# Patient Record
Sex: MALE | Race: ASIAN | Employment: FULL TIME | URBAN - METROPOLITAN AREA
[De-identification: names, ages, dates, MRNs, and addresses within clinical notes are randomized per-mention and may not be internally consistent; named-entity substitution may affect disease eponyms.]

---

## 2017-08-18 PROCEDURE — 84402 ASSAY OF FREE TESTOSTERONE: CPT | Performed by: FAMILY MEDICINE

## 2017-08-18 PROCEDURE — 84403 ASSAY OF TOTAL TESTOSTERONE: CPT | Performed by: FAMILY MEDICINE

## 2017-08-18 PROCEDURE — 82607 VITAMIN B-12: CPT | Performed by: FAMILY MEDICINE

## 2017-08-18 PROCEDURE — 83001 ASSAY OF GONADOTROPIN (FSH): CPT | Performed by: FAMILY MEDICINE

## 2017-08-18 PROCEDURE — 82746 ASSAY OF FOLIC ACID SERUM: CPT | Performed by: FAMILY MEDICINE

## 2017-08-18 PROCEDURE — 83002 ASSAY OF GONADOTROPIN (LH): CPT | Performed by: FAMILY MEDICINE

## 2017-12-27 PROCEDURE — 83002 ASSAY OF GONADOTROPIN (LH): CPT | Performed by: INTERNAL MEDICINE

## 2017-12-27 PROCEDURE — 84403 ASSAY OF TOTAL TESTOSTERONE: CPT | Performed by: INTERNAL MEDICINE

## 2017-12-27 PROCEDURE — 84146 ASSAY OF PROLACTIN: CPT | Performed by: INTERNAL MEDICINE

## 2017-12-27 PROCEDURE — 84402 ASSAY OF FREE TESTOSTERONE: CPT | Performed by: INTERNAL MEDICINE

## 2017-12-27 PROCEDURE — 83001 ASSAY OF GONADOTROPIN (FSH): CPT | Performed by: INTERNAL MEDICINE

## 2018-04-20 PROBLEM — R07.89 CHEST PRESSURE: Status: ACTIVE | Noted: 2018-04-20

## 2018-05-10 PROCEDURE — 84403 ASSAY OF TOTAL TESTOSTERONE: CPT | Performed by: INTERNAL MEDICINE

## 2018-05-10 PROCEDURE — 84402 ASSAY OF FREE TESTOSTERONE: CPT | Performed by: INTERNAL MEDICINE

## 2018-05-25 PROBLEM — I49.3 PVC'S (PREMATURE VENTRICULAR CONTRACTIONS): Status: ACTIVE | Noted: 2018-05-25

## 2021-01-18 PROBLEM — M54.16 LUMBAR RADICULOPATHY: Status: ACTIVE | Noted: 2021-01-18

## 2024-03-19 RX ORDER — VIT A/VIT C/VIT E/ZINC/COPPER 7160-113
TABLET, DELAYED RELEASE (ENTERIC COATED) ORAL
COMMUNITY

## 2024-04-10 ENCOUNTER — HOSPITAL ENCOUNTER (OUTPATIENT)
Facility: HOSPITAL | Age: 59
Setting detail: HOSPITAL OUTPATIENT SURGERY
Discharge: HOME | End: 2024-04-10
Attending: INTERNAL MEDICINE | Admitting: INTERNAL MEDICINE
Payer: COMMERCIAL

## 2024-04-10 ENCOUNTER — ANESTHESIA (OUTPATIENT)
Dept: ENDOSCOPY | Facility: HOSPITAL | Age: 59
End: 2024-04-10
Payer: COMMERCIAL

## 2024-04-10 ENCOUNTER — ANESTHESIA EVENT (OUTPATIENT)
Dept: ENDOSCOPY | Facility: HOSPITAL | Age: 59
End: 2024-04-10
Payer: COMMERCIAL

## 2024-04-10 VITALS
RESPIRATION RATE: 16 BRPM | HEIGHT: 70.5 IN | SYSTOLIC BLOOD PRESSURE: 106 MMHG | BODY MASS INDEX: 27.46 KG/M2 | TEMPERATURE: 98 F | DIASTOLIC BLOOD PRESSURE: 64 MMHG | OXYGEN SATURATION: 95 % | HEART RATE: 75 BPM | WEIGHT: 194 LBS

## 2024-04-10 PROCEDURE — 0BBG8ZX EXCISION OF LEFT UPPER LUNG LOBE, VIA NATURAL OR ARTIFICIAL OPENING ENDOSCOPIC, DIAGNOSTIC: ICD-10-PCS | Performed by: INTERNAL MEDICINE

## 2024-04-10 PROCEDURE — 88305 TISSUE EXAM BY PATHOLOGIST: CPT | Performed by: INTERNAL MEDICINE

## 2024-04-10 PROCEDURE — 88341 IMHCHEM/IMCYTCHM EA ADD ANTB: CPT | Performed by: INTERNAL MEDICINE

## 2024-04-10 PROCEDURE — 88342 IMHCHEM/IMCYTCHM 1ST ANTB: CPT | Performed by: INTERNAL MEDICINE

## 2024-04-10 PROCEDURE — 0B9G8ZX DRAINAGE OF LEFT UPPER LUNG LOBE, VIA NATURAL OR ARTIFICIAL OPENING ENDOSCOPIC, DIAGNOSTIC: ICD-10-PCS | Performed by: INTERNAL MEDICINE

## 2024-04-10 PROCEDURE — 88104 CYTOPATH FL NONGYN SMEARS: CPT | Performed by: INTERNAL MEDICINE

## 2024-04-10 RX ORDER — NALOXONE HYDROCHLORIDE 0.4 MG/ML
0.08 INJECTION, SOLUTION INTRAMUSCULAR; INTRAVENOUS; SUBCUTANEOUS AS NEEDED
Status: DISCONTINUED | OUTPATIENT
Start: 2024-04-10 | End: 2024-04-10

## 2024-04-10 RX ORDER — SODIUM CHLORIDE, SODIUM LACTATE, POTASSIUM CHLORIDE, CALCIUM CHLORIDE 600; 310; 30; 20 MG/100ML; MG/100ML; MG/100ML; MG/100ML
INJECTION, SOLUTION INTRAVENOUS CONTINUOUS
Status: DISCONTINUED | OUTPATIENT
Start: 2024-04-10 | End: 2024-04-10

## 2024-04-10 RX ORDER — PROCHLORPERAZINE EDISYLATE 5 MG/ML
5 INJECTION INTRAMUSCULAR; INTRAVENOUS EVERY 8 HOURS PRN
Status: DISCONTINUED | OUTPATIENT
Start: 2024-04-10 | End: 2024-04-10

## 2024-04-10 RX ORDER — HYDROMORPHONE HYDROCHLORIDE 1 MG/ML
0.2 INJECTION, SOLUTION INTRAMUSCULAR; INTRAVENOUS; SUBCUTANEOUS EVERY 5 MIN PRN
Status: DISCONTINUED | OUTPATIENT
Start: 2024-04-10 | End: 2024-04-10

## 2024-04-10 RX ORDER — ONDANSETRON 2 MG/ML
4 INJECTION INTRAMUSCULAR; INTRAVENOUS EVERY 6 HOURS PRN
Status: DISCONTINUED | OUTPATIENT
Start: 2024-04-10 | End: 2024-04-10

## 2024-04-10 RX ORDER — LIDOCAINE HYDROCHLORIDE 10 MG/ML
INJECTION, SOLUTION EPIDURAL; INFILTRATION; INTRACAUDAL; PERINEURAL AS NEEDED
Status: DISCONTINUED | OUTPATIENT
Start: 2024-04-10 | End: 2024-04-10 | Stop reason: SURG

## 2024-04-10 RX ORDER — HYDROMORPHONE HYDROCHLORIDE 1 MG/ML
0.6 INJECTION, SOLUTION INTRAMUSCULAR; INTRAVENOUS; SUBCUTANEOUS EVERY 5 MIN PRN
Status: DISCONTINUED | OUTPATIENT
Start: 2024-04-10 | End: 2024-04-10

## 2024-04-10 RX ORDER — HYDROMORPHONE HYDROCHLORIDE 1 MG/ML
0.4 INJECTION, SOLUTION INTRAMUSCULAR; INTRAVENOUS; SUBCUTANEOUS EVERY 5 MIN PRN
Status: DISCONTINUED | OUTPATIENT
Start: 2024-04-10 | End: 2024-04-10

## 2024-04-10 RX ORDER — LIDOCAINE HYDROCHLORIDE 20 MG/ML
INJECTION, SOLUTION INFILTRATION; PERINEURAL
Status: DISCONTINUED | OUTPATIENT
Start: 2024-04-10 | End: 2024-04-10

## 2024-04-10 RX ADMIN — SODIUM CHLORIDE, SODIUM LACTATE, POTASSIUM CHLORIDE, CALCIUM CHLORIDE: 600; 310; 30; 20 INJECTION, SOLUTION INTRAVENOUS at 16:19:00

## 2024-04-10 RX ADMIN — LIDOCAINE HYDROCHLORIDE 25 MG: 10 INJECTION, SOLUTION EPIDURAL; INFILTRATION; INTRACAUDAL; PERINEURAL at 16:05:00

## 2024-04-10 NOTE — ANESTHESIA PREPROCEDURE EVALUATION
PRE-OP EVALUATION    Patient Name: Lior Brown    Admit Diagnosis: ABNORMAL CT CHEST; HEMOPTYSIS    Pre-op Diagnosis: ABNORMAL CT CHEST; HEMOPTYSIS    BRONCHOSCOPY WITH BRONCHOALVEOLAR LAVAGE AND TRANSBRONCHIAL BIOPSY    Anesthesia Procedure: BRONCHOSCOPY WITH BRONCHOALVEOLAR LAVAGE AND TRANSBRONCHIAL BIOPSY    Surgeons and Role:     * Aquilino Loo MD - Primary    Pre-op vitals reviewed.        Body mass index is 27.44 kg/m².    Current medications reviewed.  Hospital Medications:  No current facility-administered medications on file as of 4/10/2024.       Outpatient Medications:     Medications Prior to Admission   Medication Sig Dispense Refill Last Dose   • Coenzyme Q10 (CO Q 10 OR) Take by mouth.      • Multiple Vitamins-Minerals (ICAPS AREDS FORMULA) Oral Tab Take by mouth.      • CITALOPRAM 20 MG Oral Tab TAKE 1 TABLET DAILY (Patient taking differently: Take 0.5 tablets (10 mg total) by mouth daily.) 90 tablet 0    • tamsulosin (FLOMAX) cap Take 1 capsule (0.4 mg total) by mouth daily. Due for yearly follow up. Please schedule to continue receiving refills. 90 capsule 0    • lisinopril 40 MG Oral Tab Take 1 tablet (40 mg total) by mouth daily.      • Multiple Vitamin (MULTI-VITAMIN DAILY) Oral Tab Take by mouth.      • aspirin 81 MG Oral Chew Tab Chew 1 tablet (81 mg total) by mouth daily. 100 tablet 3    • rosuvastatin 5 MG Oral Tab Take 1 tablet (5 mg total) by mouth nightly. 90 tablet 3    • chlorthalidone 25 MG Oral Tab Take 0.5 tablets (12.5 mg total) by mouth daily. 45 tablet 3    • Vitamin D3 (VITAMIN D3) 1000 UNITS Oral Tab Take 1 tablet (1,000 Units total) by mouth daily.          Allergies: Patient has no known allergies.      Anesthesia Evaluation    Patient summary reviewed.    Anesthetic Complications           GI/Hepatic/Renal      (+) GERD                           Cardiovascular                  (+) hypertension                                     Endo/Other                                   Pulmonary                    (+) sleep apnea       Neuro/Psych                 (+) neuromuscular disease                   Past Surgical History:   Procedure Laterality Date   • Other surgical history  8/26/11    cysto flow u/s-Dr. Lantigua   • Other surgical history  12/6/13    Flow  - Dr. Lantigua   • Other surgical history  11/21/14    Flow US - Dr. Lantigua   • Other surgical history  12/28/15    Flow  - Dr. Lantigua   • Other surgical history  1/20/17    flow  dr lantigua   • Other surgical history  02/19/2019    Cystoscopy- Dr. Lantigua   • Other surgical history  01/12/2021    Flow  - Dr. Cintron    • Upper gi endoscopy,diagnosis  10/5/2011    Performed by DHIRAJ CASTANEDA at [facility] [place], [facility]     Social History     Socioeconomic History   • Marital status:    Tobacco Use   • Smoking status: Former     Types: Cigarettes   • Smokeless tobacco: Never   • Tobacco comments:     Once or twice yearly   Vaping Use   • Vaping status: Never Used   Substance and Sexual Activity   • Alcohol use: Yes     Alcohol/week: 1.0 standard drink of alcohol     Types: 1 Standard drinks or equivalent per week     Comment: rare   • Drug use: No     History   Drug Use No     Available pre-op labs reviewed.               Airway      Mallampati: II  Mouth opening: 3 FB  TM distance: 4 - 6 cm  Neck ROM: full Cardiovascular    Cardiovascular exam normal.  Rhythm: regular  Rate: normal     Dental             Pulmonary    Pulmonary exam normal.                 Other findings          ASA: 2   Plan: MAC  NPO status verified and patient meets guidelines.          Plan/risks discussed with: patient and significant other            Present on Admission:  **None**

## 2024-04-10 NOTE — ANESTHESIA POSTPROCEDURE EVALUATION
[facility]    Ramanuj Stephanie Patient Status:  Hospital Outpatient Surgery   Age/Gender 58 year old male MRN [id number]   Location [facility] ENDOSCOPY PAIN CENTER Attending Aquilino Loo MD   Hosp Day # 0 PCP Ximena Doran MD       Anesthesia Post-op Note    BRONCHOSCOPY WITH BRONCHOALVEOLAR LAVAGE AND ENDOBRONCHIAL BIOPSY    Procedure Summary       Date: 04/10/24 Room / Location:  ENDOSCOPY 04 /  ENDOSCOPY    Anesthesia Start: 1558 Anesthesia Stop: 1619    Procedure: BRONCHOSCOPY WITH BRONCHOALVEOLAR LAVAGE AND ENDOBRONCHIAL BIOPSY Diagnosis: (ENDOBRONCHIAL LESION)    Surgeons: Aquilino Loo MD Anesthesiologist: Hamlet Jacobs MD    Anesthesia Type: MAC ASA Status: 2            Anesthesia Type: MAC    Vitals Value Taken Time   /53 04/10/24 1619   Temp 98.3 °F (36.8 °C) 04/10/24 1619   Pulse 78 04/10/24 1619   Resp 16 04/10/24 1619   SpO2 97 % 04/10/24 1619   Vitals shown include unfiled device data.    Patient Location: Endoscopy    Anesthesia Type: MAC    Airway Patency: patent    Postop Pain Control: adequate    Mental Status: mildly sedated but able to meaningfully participate in the post-anesthesia evaluation    Nausea/Vomiting: none    Cardiopulmonary/Hydration status: stable euvolemic    Complications: no apparent anesthesia related complications    Postop vital signs: stable    Dental Exam: Unchanged from Preop    Patient to be discharged home when criteria met.

## 2024-04-10 NOTE — H&P
Preprocedure Note    I have reviewed the progress note by Dr. Price, dated 3/12/24. There has been no interval change.    Briefly, this is a 58 year old man with a history of anxiety, hypertension, hyperlipidemia, gout, MARLENE who is followed by Dr. Price in the [facility] pulmonary clinic. He was noted on outpatient CT scan done in 10/2023 and 3/2024 to have bronchiectasis and mucus plugging. He was referred to me for bronchoscopy for airway inspection, BAL, and possible biopsy.     Informed consent was obtained for bronchoscopy with BAL and possible biopsy under MAC anesthesia.      Aquilino Loo M.D.  Pulmonary/Critical Care

## 2024-04-10 NOTE — DISCHARGE INSTRUCTIONS
Home Care Instructions Following Bronchoscopy with Sedation    Diet:  Prior to your examination, a local anesthetic was used to numb the back of your throat. Do not eat or drink for two hours. Your nurse will instruct you with the time you may resume your diet.  Sip fluids initially and advance to your regular diet as tolerated. MAY START WITH SIPS OF LIQUID AT 6:15PM  Do not drink alcohol today.    Medication:  If you have questions about resuming your normal medications, please contact your Primary Care Physician.    Activities:  Do not drive today.  Do not operate any machinery today (including kitchen equipment).    What to Expect:  A sore throat  A cough  Hoarseness  A small amount of blood in your sputum    Contact Your Doctor If:  You have difficulty breathing  You have chest pain  You have a fever greater than 102°F  You cough up more than a few tablespoons of blood in your sputum    **If unable to reach your doctor, please go to the [facility] Emergency Room**    - Your referring physician will receive a full report of your examination.  - Please contact your physician’s office within one week for results if appointment not scheduled.

## 2024-04-10 NOTE — OPERATIVE REPORT
Ramanuj Stephanie Patient Status:  Hospital Outpatient Surgery    1965  MRN [id number]    Location [facility] ENDOSCOPY Attending Aquilino Loo MD   Hosp Day # 0 PCP Ximena Doran MD      Bronchoscopy Procedure Note - [facility]    Procedure(s) performed:  Bronchoscopy, Diagnostic  Bronchoalveolar lavage, BAL  Endobronchial biopsy    Pre-Operative Diagnosis:   Abnormal CT scan    Post-Operative Diagnosis:   Left upper lobe endobronchial lesion    Anesthesia: Monitored Anesthesia Care (MAC)    Procedure Details:   Patient was consented for the procedure with all risks and benefit of the procedure explained in detail.  Patient was given the opportunity to ask questions.  Patient was given IV sedation by the anesthesiologist.    After the patient was adequately sedated, the bronchoscope was inserted orally and advanced to the vocal cords. Topical lidocaine was administered onto the vocal cords. The scope was advanced into the trachea, where more topical lidocaine was administered. The trachea was normal. The scope was advanced to the main rachel and more topical lidocaine was administered onto the rachel and bilateral mainstem bronchi. The rachel was sharp and normal.     The scope was advanced into the right mainstem bronchus and advanced to the right upper lobe, right bronchus intermedius, right middle lobe, and right lower lobe, which were normal.     The scope was then advanced into the left mainstem bronchus, and advanced distally. There was a smooth endobronchial lesion noted in the left upper lobe. The lingular bronchus and left lower lobe bronchi were normal. Bronchoalveolar lavage was performed in the left upper lobe with 100cc of normal saline instilled into the airways and aspirated back. Next endobronchial biopsies were taken from the left upper lobe endobronchial lesion. The lesion was friable and topical iced saline was administered to aid in hemostasis. The airways  were suctioned and there was adequate hemostasis.    After airway inspection, BAL, and endobronchial biopsies were performed, the scope was slowly removed. There were no complications. Patient tolerated the procedure well.     Estimated Blood Loss:    less than 50 mL           Specimens:   BAL from left upper lobe   Endobronchial biopsies from left upper lobe     Complications:   None; patient tolerated the procedure well.           Disposition:   Endoscopy Lab Recovery Room    Impression:  A left upper lobe endobronchial lesion was noted in the left upper lobe on airway inspection. The lesion was occluding the left upper lobe.   Endobronchial biopsies were taken from the left upper lobe endobronchial lesion, as above  Left upper lobe BAL was performed, as above    Plan:    Follow up bronchial cultures and biopsy results          Aquilino Loo M.D.  Pulmonary/Critical Care

## 2024-04-24 ENCOUNTER — APPOINTMENT (OUTPATIENT)
Dept: HEMATOLOGY/ONCOLOGY | Facility: HOSPITAL | Age: 59
End: 2024-04-24
Attending: THORACIC SURGERY (CARDIOTHORACIC VASCULAR SURGERY)

## 2024-04-24 ENCOUNTER — OFFICE VISIT (OUTPATIENT)
Dept: HEMATOLOGY/ONCOLOGY | Facility: HOSPITAL | Age: 59
End: 2024-04-24
Attending: THORACIC SURGERY (CARDIOTHORACIC VASCULAR SURGERY)
Payer: COMMERCIAL

## 2024-04-24 VITALS
WEIGHT: 198 LBS | BODY MASS INDEX: 28.03 KG/M2 | RESPIRATION RATE: 16 BRPM | SYSTOLIC BLOOD PRESSURE: 152 MMHG | TEMPERATURE: 98 F | HEART RATE: 64 BPM | OXYGEN SATURATION: 97 % | HEIGHT: 70.51 IN | DIASTOLIC BLOOD PRESSURE: 88 MMHG

## 2024-04-24 DIAGNOSIS — D3A.8 NEUROENDOCRINE TUMOR (HCC): Primary | ICD-10-CM

## 2024-04-24 PROCEDURE — 99211 OFF/OP EST MAY X REQ PHY/QHP: CPT

## 2024-04-24 NOTE — CONSULTS
Thoracic Surgery Consult Note     Name: Lior Brown   Age: 58 year old   Sex: male.   MRN: [id number]    Reason for Consultation: left upper lobe neuroendocrine tumor    Consulting Physician: Dr. Loo    Subjective:     Chief Complaint: \"I have a lung tumor\"     History of Present Illness:   Mr. Brown is a 58 year old male former smoker (quit 30 years ago, 3pyh) presenting with left upper lobe neuroendocrine tumor.     This was initially found on workup for hemoptysis x3 days (quarter sized) in October 2023. CT had the time showed endobronchial mucoid impaction with nodular area in the central left upper lobe bronchus. Further work up was delayed due to trip to [facility]. Repeat CT chest from 3/2/24 showed bronchial wall thickening with mucus plugging and dilated left upper lobe bronchus measuring 1.3cm. Subsequent bronchoscopy with biopsy by Dr. Loo was positive for left upper lobe neuroendocrine tumor, atypical vs typical carcinoid. Patient was referred by Dr. Loo to discuss surgical options.     Patient feels well. No further episodes of hemoptysis or cough. He is active and can go up stairs without dyspnea. He reports occasional chest \"pressure\" that he believes is secondary to GERD. No chest pain with activity. No fevers, chills, weight loss, changes in vision, headache, diarrhea, facial flushing, or new bone pain.     PMH includes BPH, anxiety, GERD, HTN. He takes a baby aspirin and follows with cardiologist, Dr. Simon. No prior thoracic surgeries. No known family history of cancer.     Review Of Systems:   10 point review of systems was conducted and was negative except for the pertinent positives listed in the above HPI.    Past Medical History:   Past Medical History:    Anxiety    BPH (benign prostatic hyperplasia)    GERD    Gout    HTN (hypertension)    Hyperlipidemia    MARLENE (obstructive sleep apnea)    PSg at [place] 2/5/10 AHI 23 with significant O2 desaturation       Past Surgical  History:   Past Surgical History:   Procedure Laterality Date    Other surgical history  8/26/11    cysto flow u/s-Dr. Lantigua    Other surgical history  12/6/13    Flow US - Dr. Lantigua    Other surgical history  11/21/14    Flow US - Dr. Lantigua    Other surgical history  12/28/15    Flow US - Dr. Lantigua    Other surgical history  1/20/17    flow  dr lantigua    Other surgical history  02/19/2019    Cystoscopy- Dr. Lantigua    Other surgical history  01/12/2021    Flow  - Dr. Cintron     Upper gi endoscopy,diagnosis  10/5/2011    Performed by DHIRAJ CASTANEDA at [facility] [place], [facility]       Social History:   Social History     Socioeconomic History    Marital status:      Spouse name: Not on file    Number of children: Not on file    Years of education: Not on file    Highest education level: Not on file   Occupational History    Not on file   Tobacco Use    Smoking status: Former     Types: Cigarettes    Smokeless tobacco: Never    Tobacco comments:     Once or twice yearly   Vaping Use    Vaping status: Never Used   Substance and Sexual Activity    Alcohol use: Yes     Alcohol/week: 1.0 standard drink of alcohol     Types: 1 Standard drinks or equivalent per week     Comment: rare    Drug use: No    Sexual activity: Not on file   Other Topics Concern    Not on file   Social History Narrative    Not on file     Social Determinants of Health     Financial Resource Strain: Not on file   Food Insecurity: Not on file   Transportation Needs: Not on file   Physical Activity: Not on file   Stress: Not on file   Social Connections: Not on file   Housing Stability: Low Risk  (4/17/2024)    Received from [facility]    Housing Stability     Mortgage Payment Concerns?: Not on file     Number of Places Lived in the Last Year: Not on file     Unstable Housing?: Not on file       Family History:   Family History   Problem Relation Age of Onset    Heart Disorder Father     Heart Disorder Mother         stroke    Hypertension  Brother     Diabetes Brother     Cancer Maternal Grandmother         lung       Allergies:  No Known Allergies    Medications:   Current Outpatient Medications on File Prior to Visit   Medication Sig Dispense Refill    Coenzyme Q10 (CO Q 10 OR) Take by mouth.      Multiple Vitamins-Minerals (ICAPS AREDS FORMULA) Oral Tab Take by mouth.      CITALOPRAM 20 MG Oral Tab TAKE 1 TABLET DAILY (Patient taking differently: Take 0.5 tablets (10 mg total) by mouth daily.) 90 tablet 0    tamsulosin (FLOMAX) cap Take 1 capsule (0.4 mg total) by mouth daily. Due for yearly follow up. Please schedule to continue receiving refills. 90 capsule 0    lisinopril 40 MG Oral Tab Take 1 tablet (40 mg total) by mouth daily.      Multiple Vitamin (MULTI-VITAMIN DAILY) Oral Tab Take by mouth.      aspirin 81 MG Oral Chew Tab Chew 1 tablet (81 mg total) by mouth daily. 100 tablet 3    rosuvastatin 5 MG Oral Tab Take 1 tablet (5 mg total) by mouth nightly. 90 tablet 3    chlorthalidone 25 MG Oral Tab Take 0.5 tablets (12.5 mg total) by mouth daily. 45 tablet 3    Vitamin D3 (VITAMIN D3) 1000 UNITS Oral Tab Take 1 tablet (1,000 Units total) by mouth daily.       No current facility-administered medications on file prior to visit.     No current facility-administered medications on file as of 4/24/2024.         Objective:      Vital Signs:  /88 (BP Location: Left arm, Patient Position: Sitting, Cuff Size: large)   Pulse 64   Temp 97.5 °F (36.4 °C) (Temporal)   Resp 16   Ht 1.791 m (5' 10.51\")   Wt 89.8 kg (198 lb)   SpO2 97%   BMI 28.00 kg/m²     Physical Exam:  General: well appearing male in no acute distress  HEENT: Normocephalic, PERRL, EOMI, no scleral icterus  Neck: Supple, trachea midline, no JVD, no masses. Thyroid not grossly enlarged  Nodes: no cervical or supraclavicular lymphadenopathy appreciated  Heart: regular rate and rhythm. No murmurs, rubs or gallops. No lower extremity edema.  Lungs: Normal respiratory effort.  Clear to ascultation bilaterally.   Abdomen: Soft, Non-tender, non-distended. No hepatosplenomegaly noted.  Extremities: No clubbing or cyanosis. No lateralizing weakness  Neuro: No gross cranial nerve defects, no loss of sensation  Psych:  oriented to person place and time, normal mood and affect      Labs:   Lab Results   Component Value Date/Time    WBC 6.60 08/14/2021 10:10 AM    HGB 13.5 08/14/2021 10:10 AM    HCT 39.9 08/14/2021 10:10 AM     08/14/2021 10:10 AM    MCV 89.3 08/14/2021 10:10 AM     Lab Results   Component Value Date/Time     08/14/2021 10:10 AM    K 3.82 08/14/2021 10:10 AM     08/14/2021 10:10 AM    CO2 29.1 (H) 08/14/2021 10:10 AM    BUN 8.0 08/14/2021 10:10 AM     (H) 08/14/2021 10:10 AM    CA 9.6 08/14/2021 10:10 AM    MG 2.10 10/25/2019 08:13 AM     No results found for: \"INR\", \"PT\", \"PTT\"  No components found for: \"TROPI\"  Lab Results   Component Value Date/Time    ALB 4.5 12/04/2021 09:21 AM    TP 7.2 12/04/2021 09:21 AM    ALT 36 12/04/2021 09:21 AM    AST 24 12/04/2021 09:21 AM         Review of Data:   CT chest 3/2/24:   FINDINGS:      Lungs and large airways: As seen previously, there is bronchial wall thickening with multiple   scattered mucous filled bronchi. The largest dilated bronchus is in the left upper lobe, measuring   up to 1.3 cm, progressed compared to the previous exam. An underlying mass lesion is not excluded.   There is mild motion artifact. Underlying emphysematous changes are seen in the lungs. Calcified   granulomas are present.     Pleura:  Normal. No pleural effusion or thickening.     Heart and pericardium:  Heart size is normal. No pericardial effusion.     Mediastinum and diallo: No abnormally enlarged lymph nodes are appreciated.     Chest wall and lower neck:  Unremarkable.     Vessels:  Minimal scattered atherosclerosis is present.     Bones:  Mild degenerative changes are seen in the spine.     Upper abdomen: Grossly unremarkable.      =====   IMPRESSION:     1. Emphysema and bronchial wall thickening. There are multiple mucous filled bronchi particularly in   the left upper lobe, which appears to have progressed compared to the previous study. An   endobronchial solid lesion is not excluded. Further evaluation with PET/CT or bronchoscopy is   suggested.     Bronchoscopy 4/10/24:   Final Diagnosis:   Left lung upper lobe endobronchial biopsy:  -Neuroendocrine tumor, in biopsy.  -See comment.     Final Diagnosis:   Bronchoalveolar lavage fluid, left upper lobe lung, cytospins and cell block preparation:   -Adequate for evaluation.   -Neoplasm present, consistent with neuroendocrine tumor.  See comment.   -See concurrent surgical case [id number] for further information/evaluation.     PFTs 4/19/24: FEV1 86%, DLCO 78%    Assessment/Plan:     Mr. Brown is a 58 year old male  former smoker (quit 30 years ago, 3pyh) presenting with left upper lobe neuroendocrine tumor.     This was initially found on workup for hemoptysis x3 days in October 2023. CT had the time showed endobronchial mucoid impaction with nodular area in the central left upper lobe bronchus. Further work up was delayed due to trip to [facility]. Repeat CT chest from 3/2/24 showed bronchial wall thickening with mucus plugging and dilated left upper lobe bronchus measuring 1.3cm. Subsequent bronchoscopy with biopsy by Dr. Loo was positive for left upper lobe neuroendocrine tumor, atypical vs typical carcinoid. Patient was referred by Dr. Loo to discuss surgical options.     Discussed options including left VATS upper lobectomy. Discussed the risks and benefits of surgery. Patient's PFTs suggest he has adequate pulmonary reserve to tolerate the proposed surgery. Recommend dotatate PET prior to surgery to make sure the disease is localized. Patient expressed understanding and would like to get other opinions before proceeding with surgery.     -Dotatate PET   -Patient will call to  schedule left VATS upper lobectomy with Dr. Chaidez.    -Pre op labs ordered  -Hold ASA 7 days prior     Yamileth Amaya PA-C  Thoracic Surgery    I have seen and examined that patient and agree with the above assessment and plan.  I have personally reviewed all the pertinent imaging, discussed the case in thoracic oncology tumor board and with the consulting physician.     Mr. Brown  is a 58 year old male who had a left upper lobe lung nodule found on a CT scan done for hemoptysis. These findings were concerning for lung cancer and thus a biopsy was done. The biopsy proved carcinoid tumor.  As a next step, I would like for Mr. Brown to get a staging procedure in the form of a Dotatate PET.  If there are signs of regional or distant metastasis, I will recommend that those sites get biopsied as well.  If not, I described for Mr. Brown a minimally invasive, VATS lobectomy as treatment.  His pulmonary function tests suggest that he has adequate lung function to undergo the proposed surgery.  I went over the alternatives (no surgery, chemo/radiation, more complex sleeve resection) and the risks, including: bleeding, infection, prolonged air leak, nerve injury, MI, stroke, arrhythmia, pneumonia and death. He understands these risks and wishes to proceed.  We will plan on surgery or additional staging proceudres once he has gotten the PET     Sundeep Chaidez MD  Thoracic Surgery  Pager [phone]      I spent 80 minutes on this visit which included: review of tests, independently interpreting results, obtaining history, counseling on additional tests and procedures, communicating with the consulting physician, care coordination and surgery, its risks and alternatives as described in the above assessment and plan.

## 2024-04-24 NOTE — H&P (VIEW-ONLY)
Thoracic Surgery Consult Note     Name: Lior Brown   Age: 58 year old   Sex: male.   MRN: [id number]    Reason for Consultation: left upper lobe neuroendocrine tumor    Consulting Physician: Dr. Loo    Subjective:     Chief Complaint: \"I have a lung tumor\"     History of Present Illness:   Mr. Brown is a 58 year old male former smoker (quit 30 years ago, 3pyh) presenting with left upper lobe neuroendocrine tumor.     This was initially found on workup for hemoptysis x3 days (quarter sized) in October 2023. CT had the time showed endobronchial mucoid impaction with nodular area in the central left upper lobe bronchus. Further work up was delayed due to trip to [facility]. Repeat CT chest from 3/2/24 showed bronchial wall thickening with mucus plugging and dilated left upper lobe bronchus measuring 1.3cm. Subsequent bronchoscopy with biopsy by Dr. Loo was positive for left upper lobe neuroendocrine tumor, atypical vs typical carcinoid. Patient was referred by Dr. Loo to discuss surgical options.     Patient feels well. No further episodes of hemoptysis or cough. He is active and can go up stairs without dyspnea. He reports occasional chest \"pressure\" that he believes is secondary to GERD. No chest pain with activity. No fevers, chills, weight loss, changes in vision, headache, diarrhea, facial flushing, or new bone pain.     PMH includes BPH, anxiety, GERD, HTN. He takes a baby aspirin and follows with cardiologist, Dr. Simon. No prior thoracic surgeries. No known family history of cancer.     Review Of Systems:   10 point review of systems was conducted and was negative except for the pertinent positives listed in the above HPI.    Past Medical History:   Past Medical History:    Anxiety    BPH (benign prostatic hyperplasia)    GERD    Gout    HTN (hypertension)    Hyperlipidemia    MARLENE (obstructive sleep apnea)    PSg at [facility] 2/5/10 AHI 23 with significant O2 desaturation       Past Surgical  History:   Past Surgical History:   Procedure Laterality Date    Other surgical history  8/26/11    cysto flow u/s-Dr. Lantigua    Other surgical history  12/6/13    Flow US - Dr. Lantigua    Other surgical history  11/21/14    Flow US - Dr. Lantigua    Other surgical history  12/28/15    Flow US - Dr. Lantigua    Other surgical history  1/20/17    flow  dr lantigua    Other surgical history  02/19/2019    Cystoscopy- Dr. Lantigua    Other surgical history  01/12/2021    Flow  - Dr. Cintron     Upper gi endoscopy,diagnosis  10/5/2011    Performed by DHIRAJ CASTANEDA at [facility] [place]       Social History:   Social History     Socioeconomic History    Marital status:      Spouse name: Not on file    Number of children: Not on file    Years of education: Not on file    Highest education level: Not on file   Occupational History    Not on file   Tobacco Use    Smoking status: Former     Types: Cigarettes    Smokeless tobacco: Never    Tobacco comments:     Once or twice yearly   Vaping Use    Vaping status: Never Used   Substance and Sexual Activity    Alcohol use: Yes     Alcohol/week: 1.0 standard drink of alcohol     Types: 1 Standard drinks or equivalent per week     Comment: rare    Drug use: No    Sexual activity: Not on file   Other Topics Concern    Not on file   Social History Narrative    Not on file     Social Determinants of Health     Financial Resource Strain: Not on file   Food Insecurity: Not on file   Transportation Needs: Not on file   Physical Activity: Not on file   Stress: Not on file   Social Connections: Not on file   Housing Stability: Low Risk  (4/17/2024)    [facility]    Housing Stability     Mortgage Payment Concerns?: Not on file     Number of Places Lived in the Last Year: Not on file     Unstable Housing?: Not on file       Family History:   Family History   Problem Relation Age of Onset    Heart Disorder Father     Heart Disorder Mother         stroke    Hypertension  Brother     Diabetes Brother     Cancer Maternal Grandmother         lung       Allergies:  No Known Allergies    Medications:   Current Outpatient Medications on File Prior to Visit   Medication Sig Dispense Refill    Coenzyme Q10 (CO Q 10 OR) Take by mouth.      Multiple Vitamins-Minerals (ICAPS AREDS FORMULA) Oral Tab Take by mouth.      CITALOPRAM 20 MG Oral Tab TAKE 1 TABLET DAILY (Patient taking differently: Take 0.5 tablets (10 mg total) by mouth daily.) 90 tablet 0    tamsulosin (FLOMAX) cap Take 1 capsule (0.4 mg total) by mouth daily. Due for yearly follow up. Please schedule to continue receiving refills. 90 capsule 0    lisinopril 40 MG Oral Tab Take 1 tablet (40 mg total) by mouth daily.      Multiple Vitamin (MULTI-VITAMIN DAILY) Oral Tab Take by mouth.      aspirin 81 MG Oral Chew Tab Chew 1 tablet (81 mg total) by mouth daily. 100 tablet 3    rosuvastatin 5 MG Oral Tab Take 1 tablet (5 mg total) by mouth nightly. 90 tablet 3    chlorthalidone 25 MG Oral Tab Take 0.5 tablets (12.5 mg total) by mouth daily. 45 tablet 3    Vitamin D3 (VITAMIN D3) 1000 UNITS Oral Tab Take 1 tablet (1,000 Units total) by mouth daily.       No current facility-administered medications on file prior to visit.     No current facility-administered medications on file as of 4/24/2024.         Objective:      Vital Signs:  /88 (BP Location: Left arm, Patient Position: Sitting, Cuff Size: large)   Pulse 64   Temp 97.5 °F (36.4 °C) (Temporal)   Resp 16   Ht 1.791 m (5' 10.51\")   Wt 89.8 kg (198 lb)   SpO2 97%   BMI 28.00 kg/m²     Physical Exam:  General: well appearing male in no acute distress  HEENT: Normocephalic, PERRL, EOMI, no scleral icterus  Neck: Supple, trachea midline, no JVD, no masses. Thyroid not grossly enlarged  Nodes: no cervical or supraclavicular lymphadenopathy appreciated  Heart: regular rate and rhythm. No murmurs, rubs or gallops. No lower extremity edema.  Lungs: Normal respiratory effort.  Clear to ascultation bilaterally.   Abdomen: Soft, Non-tender, non-distended. No hepatosplenomegaly noted.  Extremities: No clubbing or cyanosis. No lateralizing weakness  Neuro: No gross cranial nerve defects, no loss of sensation  Psych:  oriented to person place and time, normal mood and affect      Labs:   Lab Results   Component Value Date/Time    WBC 6.60 08/14/2021 10:10 AM    HGB 13.5 08/14/2021 10:10 AM    HCT 39.9 08/14/2021 10:10 AM     08/14/2021 10:10 AM    MCV 89.3 08/14/2021 10:10 AM     Lab Results   Component Value Date/Time     08/14/2021 10:10 AM    K 3.82 08/14/2021 10:10 AM     08/14/2021 10:10 AM    CO2 29.1 (H) 08/14/2021 10:10 AM    BUN 8.0 08/14/2021 10:10 AM     (H) 08/14/2021 10:10 AM    CA 9.6 08/14/2021 10:10 AM    MG 2.10 10/25/2019 08:13 AM     No results found for: \"INR\", \"PT\", \"PTT\"  No components found for: \"TROPI\"  Lab Results   Component Value Date/Time    ALB 4.5 12/04/2021 09:21 AM    TP 7.2 12/04/2021 09:21 AM    ALT 36 12/04/2021 09:21 AM    AST 24 12/04/2021 09:21 AM         Review of Data:   CT chest 3/2/24:   FINDINGS:      Lungs and large airways: As seen previously, there is bronchial wall thickening with multiple   scattered mucous filled bronchi. The largest dilated bronchus is in the left upper lobe, measuring   up to 1.3 cm, progressed compared to the previous exam. An underlying mass lesion is not excluded.   There is mild motion artifact. Underlying emphysematous changes are seen in the lungs. Calcified   granulomas are present.     Pleura:  Normal. No pleural effusion or thickening.     Heart and pericardium:  Heart size is normal. No pericardial effusion.     Mediastinum and diallo: No abnormally enlarged lymph nodes are appreciated.     Chest wall and lower neck:  Unremarkable.     Vessels:  Minimal scattered atherosclerosis is present.     Bones:  Mild degenerative changes are seen in the spine.     Upper abdomen: Grossly unremarkable.      =====   IMPRESSION:     1. Emphysema and bronchial wall thickening. There are multiple mucous filled bronchi particularly in   the left upper lobe, which appears to have progressed compared to the previous study. An   endobronchial solid lesion is not excluded. Further evaluation with PET/CT or bronchoscopy is   suggested.     Bronchoscopy 4/10/24:   Final Diagnosis:   Left lung upper lobe endobronchial biopsy:  -Neuroendocrine tumor, in biopsy.  -See comment.     Final Diagnosis:   Bronchoalveolar lavage fluid, left upper lobe lung, cytospins and cell block preparation:   -Adequate for evaluation.   -Neoplasm present, consistent with neuroendocrine tumor.  See comment.   -See concurrent surgical case [id number] for further information/evaluation.     PFTs 4/19/24: FEV1 86%, DLCO 78%    Assessment/Plan:     Mr. Brown is a 58 year old male  former smoker (quit 30 years ago, 3pyh) presenting with left upper lobe neuroendocrine tumor.     This was initially found on workup for hemoptysis x3 days in October 2023. CT had the time showed endobronchial mucoid impaction with nodular area in the central left upper lobe bronchus. Further work up was delayed due to trip to [facility]. Repeat CT chest from 3/2/24 showed bronchial wall thickening with mucus plugging and dilated left upper lobe bronchus measuring 1.3cm. Subsequent bronchoscopy with biopsy by Dr. Loo was positive for left upper lobe neuroendocrine tumor, atypical vs typical carcinoid. Patient was referred by Dr. Loo to discuss surgical options.     Discussed options including left VATS upper lobectomy. Discussed the risks and benefits of surgery. Patient's PFTs suggest he has adequate pulmonary reserve to tolerate the proposed surgery. Recommend dotatate PET prior to surgery to make sure the disease is localized. Patient expressed understanding and would like to get other opinions before proceeding with surgery.     -Dotatate PET   -Patient will call to  schedule left VATS upper lobectomy with Dr. Chaidez.    -Pre op labs ordered  -Hold ASA 7 days prior     Yamileth Amaya PA-C  Thoracic Surgery    I have seen and examined that patient and agree with the above assessment and plan.  I have personally reviewed all the pertinent imaging, discussed the case in thoracic oncology tumor board and with the consulting physician.     Mr. Brown  is a 58 year old male who had a left upper lobe lung nodule found on a CT scan done for hemoptysis. These findings were concerning for lung cancer and thus a biopsy was done. The biopsy proved carcinoid tumor.  As a next step, I would like for Mr. Brown to get a staging procedure in the form of a Dotatate PET.  If there are signs of regional or distant metastasis, I will recommend that those sites get biopsied as well.  If not, I described for Mr. Brown a minimally invasive, VATS lobectomy as treatment.  His pulmonary function tests suggest that he has adequate lung function to undergo the proposed surgery.  I went over the alternatives (no surgery, chemo/radiation, more complex sleeve resection) and the risks, including: bleeding, infection, prolonged air leak, nerve injury, MI, stroke, arrhythmia, pneumonia and death. He understands these risks and wishes to proceed.  We will plan on surgery or additional staging proceudres once he has gotten the PET     Sundeep Chaidez MD  Thoracic Surgery  Pager [phone]      I spent 80 minutes on this visit which included: review of tests, independently interpreting results, obtaining history, counseling on additional tests and procedures, communicating with the consulting physician, care coordination and surgery, its risks and alternatives as described in the above assessment and plan.

## 2024-04-26 NOTE — PAT NURSING NOTE
Addendum (5/3/24 @1625): date change to 5/10; per patient, he took his ASA today and will stop starting tomorrow.      Pre-Surgical Instructions for 5/13/24 with Dr. Chaidez      Pre-Surgical Testing:     -You are scheduled for non-fasting blood work and an EKG on Saturday, 4/27 at 10:15 am at the [facility], located [address] [place] [address].      Visitor Instructions    -Visitors are welcome to accompany you the day of surgery.   -Visiting hours are 7:00 am-8:00 pm.       Pre-Op Instructions    Scheduled Surgery: You are scheduled for Thoracic Surgery      Date of Procedure: Monday, 05/13/24      TENTATIVE time of arrival: 05:45 am    -This is going to be a tentative time of arrival for surgery.   -We will call you the buisness day prior to your surgery in the late afternoon to reconfirm your arrival.   -Please check your voicemail for a message.      Diet Instructions:     -Do not eat or drink after 10:00 pm. This includes water, gum, candy and food.      Medication Instructions:     CONTINUE to take your prescribed medications as directed EXCEPT:    -Do not take any medications the morning of your surgery.        Medications to Stop:     -The last dose of Aspirin will be Sunday, 5/5.    -The last dose of vitamins, supplements, and NSAID's (ex. Ibuprofen, Excederin, Aleve, Diclofenac, and any gels having steroids) will be Sunday, 5/5.          Skin Prep:     -Shower with Dial Soap the morning of your surgery (or any antibacterial soap).      Sleep Apnea:     -If you have sleep apnea, please bring your mask and tubing.      Admit/Discharge Status:     -You will be admitted to the hospital after surgery.      Discharge Teaching:     -Your nurse will give you specific instructions before discharge.  -Any questions, please call the surgeon's office.    Additional Instructions:     -Keep personal belongings to a minimum: bring insurance card(s) and picture ID, toiletries, eye glass  case, wear comfortable clothing.    -Leave all valuables at home, including jewelry.    -Bring C-Pap tubing and mask.    -Park in the [place] parking garage at [facility].  -Check in at the [facility] reception desk in the Lobby. -Our  will be there to check you in for your procedure.   -Complimentary  parking is available starting at 6:00 am.      If you are scheduled to arrive early for 5:30 am, the [facility] reception desk does not open till 5:30 am. It may be dark, but you are in the correct location.     We are open M-F from 8am- 5pm. We are closed on holidays and weekends. We can be reached at [phone].         Thank you

## 2024-04-27 ENCOUNTER — EKG ENCOUNTER (OUTPATIENT)
Dept: LAB | Age: 59
End: 2024-04-27
Attending: STUDENT IN AN ORGANIZED HEALTH CARE EDUCATION/TRAINING PROGRAM
Payer: COMMERCIAL

## 2024-04-27 DIAGNOSIS — D3A.8 NEUROENDOCRINE TUMOR (HCC): ICD-10-CM

## 2024-04-27 LAB
ANION GAP SERPL CALC-SCNC: 3 MMOL/L (ref 0–18)
BASOPHILS # BLD AUTO: 0.06 X10(3) UL (ref 0–0.2)
BASOPHILS NFR BLD AUTO: 1.3 %
BUN BLD-MCNC: 8 MG/DL (ref 9–23)
BUN/CREAT SERPL: 7.7 (ref 10–20)
CALCIUM BLD-MCNC: 9.7 MG/DL (ref 8.7–10.4)
CHLORIDE SERPL-SCNC: 108 MMOL/L (ref 98–112)
CO2 SERPL-SCNC: 32 MMOL/L (ref 21–32)
CREAT BLD-MCNC: 1.04 MG/DL
DEPRECATED RDW RBC AUTO: 43 FL (ref 35.1–46.3)
EGFRCR SERPLBLD CKD-EPI 2021: 82 ML/MIN/1.73M2 (ref 60–?)
EOSINOPHIL # BLD AUTO: 0.2 X10(3) UL (ref 0–0.7)
EOSINOPHIL NFR BLD AUTO: 4.2 %
ERYTHROCYTE [DISTWIDTH] IN BLOOD BY AUTOMATED COUNT: 13.2 % (ref 11–15)
FASTING STATUS PATIENT QL REPORTED: NO
GLUCOSE BLD-MCNC: 131 MG/DL (ref 70–99)
HCT VFR BLD AUTO: 40.1 %
HGB BLD-MCNC: 13.6 G/DL
IMM GRANULOCYTES # BLD AUTO: 0.01 X10(3) UL (ref 0–1)
IMM GRANULOCYTES NFR BLD: 0.2 %
LYMPHOCYTES # BLD AUTO: 2.04 X10(3) UL (ref 1–4)
LYMPHOCYTES NFR BLD AUTO: 43.3 %
MCH RBC QN AUTO: 30.2 PG (ref 26–34)
MCHC RBC AUTO-ENTMCNC: 33.9 G/DL (ref 31–37)
MCV RBC AUTO: 89.1 FL
MONOCYTES # BLD AUTO: 0.3 X10(3) UL (ref 0.1–1)
MONOCYTES NFR BLD AUTO: 6.4 %
NEUTROPHILS # BLD AUTO: 2.1 X10 (3) UL (ref 1.5–7.7)
NEUTROPHILS # BLD AUTO: 2.1 X10(3) UL (ref 1.5–7.7)
NEUTROPHILS NFR BLD AUTO: 44.6 %
OSMOLALITY SERPL CALC.SUM OF ELEC: 296 MOSM/KG (ref 275–295)
PLATELET # BLD AUTO: 269 10(3)UL (ref 150–450)
POTASSIUM SERPL-SCNC: 3.6 MMOL/L (ref 3.5–5.1)
RBC # BLD AUTO: 4.5 X10(6)UL
SODIUM SERPL-SCNC: 143 MMOL/L (ref 136–145)
WBC # BLD AUTO: 4.7 X10(3) UL (ref 4–11)

## 2024-04-27 PROCEDURE — 86900 BLOOD TYPING SEROLOGIC ABO: CPT

## 2024-04-27 PROCEDURE — 36415 COLL VENOUS BLD VENIPUNCTURE: CPT

## 2024-04-27 PROCEDURE — 80048 BASIC METABOLIC PNL TOTAL CA: CPT

## 2024-04-27 PROCEDURE — 86901 BLOOD TYPING SEROLOGIC RH(D): CPT

## 2024-04-27 PROCEDURE — 86850 RBC ANTIBODY SCREEN: CPT

## 2024-04-27 PROCEDURE — 93005 ELECTROCARDIOGRAM TRACING: CPT

## 2024-04-27 PROCEDURE — 85025 COMPLETE CBC W/AUTO DIFF WBC: CPT

## 2024-04-27 PROCEDURE — 93010 ELECTROCARDIOGRAM REPORT: CPT | Performed by: INTERNAL MEDICINE

## 2024-04-28 LAB
ANTIBODY SCREEN: NEGATIVE
ATRIAL RATE: 55 BPM
P AXIS: 85 DEGREES
P-R INTERVAL: 154 MS
Q-T INTERVAL: 408 MS
QRS DURATION: 112 MS
QTC CALCULATION (BEZET): 390 MS
R AXIS: 49 DEGREES
RH BLOOD TYPE: POSITIVE
RH BLOOD TYPE: POSITIVE
T AXIS: 59 DEGREES
VENTRICULAR RATE: 55 BPM

## 2024-05-01 DIAGNOSIS — D3A.8 NEUROENDOCRINE TUMOR (HCC): Primary | ICD-10-CM

## 2024-05-06 ENCOUNTER — HOSPITAL ENCOUNTER (OUTPATIENT)
Dept: NUCLEAR MEDICINE | Facility: HOSPITAL | Age: 59
Discharge: HOME | End: 2024-05-06
Attending: STUDENT IN AN ORGANIZED HEALTH CARE EDUCATION/TRAINING PROGRAM
Payer: COMMERCIAL

## 2024-05-06 DIAGNOSIS — D3A.8 NEUROENDOCRINE TUMOR (HCC): ICD-10-CM

## 2024-05-06 PROCEDURE — 78815 PET IMAGE W/CT SKULL-THIGH: CPT | Performed by: STUDENT IN AN ORGANIZED HEALTH CARE EDUCATION/TRAINING PROGRAM

## 2024-05-10 ENCOUNTER — ANESTHESIA EVENT (OUTPATIENT)
Dept: CARDIAC SURGERY | Facility: HOSPITAL | Age: 59
DRG: 164 | End: 2024-05-10
Payer: COMMERCIAL

## 2024-05-10 ENCOUNTER — ANESTHESIA (OUTPATIENT)
Dept: CARDIAC SURGERY | Facility: HOSPITAL | Age: 59
DRG: 164 | End: 2024-05-10
Payer: COMMERCIAL

## 2024-05-10 ENCOUNTER — HOSPITAL ENCOUNTER (INPATIENT)
Facility: HOSPITAL | Age: 59
LOS: 5 days | Discharge: HOME | DRG: 164 | End: 2024-05-15
Attending: THORACIC SURGERY (CARDIOTHORACIC VASCULAR SURGERY) | Admitting: THORACIC SURGERY (CARDIOTHORACIC VASCULAR SURGERY)

## 2024-05-10 DIAGNOSIS — D3A.090 CARCINOID TUMOR OF LEFT LUNG (HCC): Primary | ICD-10-CM

## 2024-05-10 DIAGNOSIS — G89.18 ACUTE POST-OPERATIVE PAIN: ICD-10-CM

## 2024-05-10 LAB — MRSA DNA SPEC QL NAA+PROBE: NEGATIVE

## 2024-05-10 PROCEDURE — 0BTG4ZZ RESECTION OF LEFT UPPER LUNG LOBE, PERCUTANEOUS ENDOSCOPIC APPROACH: ICD-10-PCS | Performed by: THORACIC SURGERY (CARDIOTHORACIC VASCULAR SURGERY)

## 2024-05-10 PROCEDURE — 07B74ZZ EXCISION OF THORAX LYMPHATIC, PERCUTANEOUS ENDOSCOPIC APPROACH: ICD-10-PCS | Performed by: THORACIC SURGERY (CARDIOTHORACIC VASCULAR SURGERY)

## 2024-05-10 PROCEDURE — 88341 IMHCHEM/IMCYTCHM EA ADD ANTB: CPT | Performed by: THORACIC SURGERY (CARDIOTHORACIC VASCULAR SURGERY)

## 2024-05-10 PROCEDURE — 0BJ08ZZ INSPECTION OF TRACHEOBRONCHIAL TREE, VIA NATURAL OR ARTIFICIAL OPENING ENDOSCOPIC: ICD-10-PCS | Performed by: THORACIC SURGERY (CARDIOTHORACIC VASCULAR SURGERY)

## 2024-05-10 PROCEDURE — 88309 TISSUE EXAM BY PATHOLOGIST: CPT | Performed by: THORACIC SURGERY (CARDIOTHORACIC VASCULAR SURGERY)

## 2024-05-10 PROCEDURE — 94799 UNLISTED PULMONARY SVC/PX: CPT

## 2024-05-10 PROCEDURE — 94660 CPAP INITIATION&MGMT: CPT

## 2024-05-10 PROCEDURE — 87641 MR-STAPH DNA AMP PROBE: CPT | Performed by: THORACIC SURGERY (CARDIOTHORACIC VASCULAR SURGERY)

## 2024-05-10 PROCEDURE — 88305 TISSUE EXAM BY PATHOLOGIST: CPT | Performed by: THORACIC SURGERY (CARDIOTHORACIC VASCULAR SURGERY)

## 2024-05-10 PROCEDURE — 3E0T3BZ INTRODUCTION OF ANESTHETIC AGENT INTO PERIPHERAL NERVES AND PLEXI, PERCUTANEOUS APPROACH: ICD-10-PCS | Performed by: THORACIC SURGERY (CARDIOTHORACIC VASCULAR SURGERY)

## 2024-05-10 PROCEDURE — 5A09357 ASSISTANCE WITH RESPIRATORY VENTILATION, LESS THAN 24 CONSECUTIVE HOURS, CONTINUOUS POSITIVE AIRWAY PRESSURE: ICD-10-PCS | Performed by: THORACIC SURGERY (CARDIOTHORACIC VASCULAR SURGERY)

## 2024-05-10 PROCEDURE — 88342 IMHCHEM/IMCYTCHM 1ST ANTB: CPT | Performed by: THORACIC SURGERY (CARDIOTHORACIC VASCULAR SURGERY)

## 2024-05-10 RX ORDER — SODIUM CHLORIDE 9 MG/ML
INJECTION, SOLUTION INTRAVENOUS CONTINUOUS
Status: DISCONTINUED | OUTPATIENT
Start: 2024-05-10 | End: 2024-05-11

## 2024-05-10 RX ORDER — OXYCODONE HYDROCHLORIDE 5 MG/1
5 TABLET ORAL EVERY 4 HOURS PRN
Status: DISCONTINUED | OUTPATIENT
Start: 2024-05-10 | End: 2024-05-15

## 2024-05-10 RX ORDER — SENNOSIDES 8.6 MG
17.2 TABLET ORAL NIGHTLY PRN
Status: DISCONTINUED | OUTPATIENT
Start: 2024-05-10 | End: 2024-05-15

## 2024-05-10 RX ORDER — OXYCODONE HYDROCHLORIDE 10 MG/1
10 TABLET ORAL EVERY 4 HOURS PRN
Status: DISCONTINUED | OUTPATIENT
Start: 2024-05-10 | End: 2024-05-15

## 2024-05-10 RX ORDER — ACETAMINOPHEN 10 MG/ML
1000 INJECTION, SOLUTION INTRAVENOUS EVERY 6 HOURS
Status: COMPLETED | OUTPATIENT
Start: 2024-05-10 | End: 2024-05-12

## 2024-05-10 RX ORDER — HYDROCODONE BITARTRATE AND ACETAMINOPHEN 5; 325 MG/1; MG/1
1 TABLET ORAL ONCE AS NEEDED
Status: ACTIVE | OUTPATIENT
Start: 2024-05-10 | End: 2024-05-10

## 2024-05-10 RX ORDER — LIDOCAINE HYDROCHLORIDE 10 MG/ML
INJECTION, SOLUTION EPIDURAL; INFILTRATION; INTRACAUDAL; PERINEURAL AS NEEDED
Status: DISCONTINUED | OUTPATIENT
Start: 2024-05-10 | End: 2024-05-10 | Stop reason: SURG

## 2024-05-10 RX ORDER — ROCURONIUM BROMIDE 10 MG/ML
INJECTION, SOLUTION INTRAVENOUS AS NEEDED
Status: DISCONTINUED | OUTPATIENT
Start: 2024-05-10 | End: 2024-05-10 | Stop reason: SURG

## 2024-05-10 RX ORDER — BUPIVACAINE HYDROCHLORIDE AND EPINEPHRINE 2.5; 5 MG/ML; UG/ML
INJECTION, SOLUTION EPIDURAL; INFILTRATION; INTRACAUDAL; PERINEURAL AS NEEDED
Status: DISCONTINUED | OUTPATIENT
Start: 2024-05-10 | End: 2024-05-10 | Stop reason: HOSPADM

## 2024-05-10 RX ORDER — LISINOPRIL 40 MG/1
40 TABLET ORAL DAILY
Status: DISCONTINUED | OUTPATIENT
Start: 2024-05-11 | End: 2024-05-15

## 2024-05-10 RX ORDER — MELATONIN
1000 DAILY
Status: DISCONTINUED | OUTPATIENT
Start: 2024-05-11 | End: 2024-05-15

## 2024-05-10 RX ORDER — ONDANSETRON 2 MG/ML
4 INJECTION INTRAMUSCULAR; INTRAVENOUS AS NEEDED
Status: DISCONTINUED | OUTPATIENT
Start: 2024-05-10 | End: 2024-05-10

## 2024-05-10 RX ORDER — ESCITALOPRAM OXALATE 5 MG/1
5 TABLET ORAL DAILY
Status: DISCONTINUED | OUTPATIENT
Start: 2024-05-11 | End: 2024-05-15

## 2024-05-10 RX ORDER — ROSUVASTATIN CALCIUM 5 MG/1
5 TABLET, COATED ORAL NIGHTLY
Status: DISCONTINUED | OUTPATIENT
Start: 2024-05-10 | End: 2024-05-15

## 2024-05-10 RX ORDER — TAMSULOSIN HYDROCHLORIDE 0.4 MG/1
0.4 CAPSULE ORAL DAILY
Status: DISCONTINUED | OUTPATIENT
Start: 2024-05-11 | End: 2024-05-15

## 2024-05-10 RX ORDER — METOCLOPRAMIDE HYDROCHLORIDE 5 MG/ML
10 INJECTION INTRAMUSCULAR; INTRAVENOUS AS NEEDED
Status: DISCONTINUED | OUTPATIENT
Start: 2024-05-10 | End: 2024-05-10

## 2024-05-10 RX ORDER — CEFAZOLIN SODIUM/WATER 2 G/20 ML
2 SYRINGE (ML) INTRAVENOUS EVERY 8 HOURS
Status: COMPLETED | OUTPATIENT
Start: 2024-05-10 | End: 2024-05-11

## 2024-05-10 RX ORDER — PHENYLEPHRINE HCL 10 MG/ML
VIAL (ML) INJECTION AS NEEDED
Status: DISCONTINUED | OUTPATIENT
Start: 2024-05-10 | End: 2024-05-10 | Stop reason: SURG

## 2024-05-10 RX ORDER — POLYETHYLENE GLYCOL 3350 17 G/17G
17 POWDER, FOR SOLUTION ORAL DAILY PRN
Status: DISCONTINUED | OUTPATIENT
Start: 2024-05-10 | End: 2024-05-15

## 2024-05-10 RX ORDER — EPHEDRINE SULFATE 50 MG/ML
INJECTION INTRAVENOUS AS NEEDED
Status: DISCONTINUED | OUTPATIENT
Start: 2024-05-10 | End: 2024-05-10 | Stop reason: SURG

## 2024-05-10 RX ORDER — ONDANSETRON 2 MG/ML
INJECTION INTRAMUSCULAR; INTRAVENOUS AS NEEDED
Status: DISCONTINUED | OUTPATIENT
Start: 2024-05-10 | End: 2024-05-10 | Stop reason: SURG

## 2024-05-10 RX ORDER — KETOROLAC TROMETHAMINE 15 MG/ML
15 INJECTION, SOLUTION INTRAMUSCULAR; INTRAVENOUS EVERY 6 HOURS
Status: COMPLETED | OUTPATIENT
Start: 2024-05-10 | End: 2024-05-12

## 2024-05-10 RX ORDER — CEFAZOLIN SODIUM 1 G/3ML
INJECTION, POWDER, FOR SOLUTION INTRAMUSCULAR; INTRAVENOUS AS NEEDED
Status: DISCONTINUED | OUTPATIENT
Start: 2024-05-10 | End: 2024-05-10 | Stop reason: SURG

## 2024-05-10 RX ORDER — NALOXONE HYDROCHLORIDE 0.4 MG/ML
0.08 INJECTION, SOLUTION INTRAMUSCULAR; INTRAVENOUS; SUBCUTANEOUS AS NEEDED
Status: ACTIVE | OUTPATIENT
Start: 2024-05-10 | End: 2024-05-10

## 2024-05-10 RX ORDER — ENEMA 19; 7 G/133ML; G/133ML
1 ENEMA RECTAL ONCE AS NEEDED
Status: DISCONTINUED | OUTPATIENT
Start: 2024-05-10 | End: 2024-05-15

## 2024-05-10 RX ORDER — SODIUM CHLORIDE 9 MG/ML
INJECTION, SOLUTION INTRAVENOUS CONTINUOUS PRN
Status: DISCONTINUED | OUTPATIENT
Start: 2024-05-10 | End: 2024-05-10 | Stop reason: SURG

## 2024-05-10 RX ORDER — ASPIRIN 81 MG/1
81 TABLET, CHEWABLE ORAL DAILY
Status: DISCONTINUED | OUTPATIENT
Start: 2024-05-11 | End: 2024-05-15

## 2024-05-10 RX ORDER — IPRATROPIUM BROMIDE AND ALBUTEROL SULFATE 2.5; .5 MG/3ML; MG/3ML
3 SOLUTION RESPIRATORY (INHALATION) EVERY 6 HOURS PRN
Status: DISCONTINUED | OUTPATIENT
Start: 2024-05-10 | End: 2024-05-15

## 2024-05-10 RX ORDER — HEPARIN SODIUM 5000 [USP'U]/ML
5000 INJECTION, SOLUTION INTRAVENOUS; SUBCUTANEOUS EVERY 8 HOURS SCHEDULED
Status: DISCONTINUED | OUTPATIENT
Start: 2024-05-10 | End: 2024-05-15

## 2024-05-10 RX ORDER — DEXAMETHASONE SODIUM PHOSPHATE 4 MG/ML
4 VIAL (ML) INJECTION AS NEEDED
Status: ACTIVE | OUTPATIENT
Start: 2024-05-10 | End: 2024-05-10

## 2024-05-10 RX ORDER — ONDANSETRON 2 MG/ML
4 INJECTION INTRAMUSCULAR; INTRAVENOUS EVERY 6 HOURS PRN
Status: DISCONTINUED | OUTPATIENT
Start: 2024-05-10 | End: 2024-05-15

## 2024-05-10 RX ORDER — CHLORTHALIDONE 25 MG/1
12.5 TABLET ORAL DAILY
Status: DISCONTINUED | OUTPATIENT
Start: 2024-05-11 | End: 2024-05-15

## 2024-05-10 RX ORDER — HYDROMORPHONE HYDROCHLORIDE 1 MG/ML
0.4 INJECTION, SOLUTION INTRAMUSCULAR; INTRAVENOUS; SUBCUTANEOUS EVERY 5 MIN PRN
Status: ACTIVE | OUTPATIENT
Start: 2024-05-10 | End: 2024-05-10

## 2024-05-10 RX ORDER — HYDROMORPHONE HYDROCHLORIDE 1 MG/ML
0.8 INJECTION, SOLUTION INTRAMUSCULAR; INTRAVENOUS; SUBCUTANEOUS EVERY 2 HOUR PRN
Status: DISCONTINUED | OUTPATIENT
Start: 2024-05-10 | End: 2024-05-15

## 2024-05-10 RX ORDER — HYDROMORPHONE HYDROCHLORIDE 1 MG/ML
0.4 INJECTION, SOLUTION INTRAMUSCULAR; INTRAVENOUS; SUBCUTANEOUS EVERY 2 HOUR PRN
Status: DISCONTINUED | OUTPATIENT
Start: 2024-05-10 | End: 2024-05-15

## 2024-05-10 RX ORDER — PROCHLORPERAZINE EDISYLATE 5 MG/ML
5 INJECTION INTRAMUSCULAR; INTRAVENOUS EVERY 8 HOURS PRN
Status: DISCONTINUED | OUTPATIENT
Start: 2024-05-10 | End: 2024-05-15

## 2024-05-10 RX ORDER — BISACODYL 10 MG
10 SUPPOSITORY, RECTAL RECTAL
Status: DISCONTINUED | OUTPATIENT
Start: 2024-05-10 | End: 2024-05-15

## 2024-05-10 RX ORDER — DEXAMETHASONE SODIUM PHOSPHATE 4 MG/ML
VIAL (ML) INJECTION AS NEEDED
Status: DISCONTINUED | OUTPATIENT
Start: 2024-05-10 | End: 2024-05-10 | Stop reason: SURG

## 2024-05-10 RX ORDER — HYDROCODONE BITARTRATE AND ACETAMINOPHEN 5; 325 MG/1; MG/1
2 TABLET ORAL ONCE AS NEEDED
Status: ACTIVE | OUTPATIENT
Start: 2024-05-10 | End: 2024-05-10

## 2024-05-10 RX ORDER — CALCIUM CARBONATE 500 MG/1
1000 TABLET, CHEWABLE ORAL 3 TIMES DAILY PRN
Status: DISCONTINUED | OUTPATIENT
Start: 2024-05-10 | End: 2024-05-15

## 2024-05-10 RX ADMIN — SODIUM CHLORIDE: 9 INJECTION, SOLUTION INTRAVENOUS at 14:54:00

## 2024-05-10 RX ADMIN — PHENYLEPHRINE HCL 100 MCG: 10 MG/ML VIAL (ML) INJECTION at 13:42:00

## 2024-05-10 RX ADMIN — PHENYLEPHRINE HCL 100 MCG: 10 MG/ML VIAL (ML) INJECTION at 11:49:00

## 2024-05-10 RX ADMIN — EPHEDRINE SULFATE 5 MG: 50 INJECTION INTRAVENOUS at 13:41:00

## 2024-05-10 RX ADMIN — PHENYLEPHRINE HCL 100 MCG: 10 MG/ML VIAL (ML) INJECTION at 11:53:00

## 2024-05-10 RX ADMIN — ROCURONIUM BROMIDE 50 MG: 10 INJECTION, SOLUTION INTRAVENOUS at 11:30:00

## 2024-05-10 RX ADMIN — SODIUM CHLORIDE: 9 INJECTION, SOLUTION INTRAVENOUS at 11:24:00

## 2024-05-10 RX ADMIN — PHENYLEPHRINE HCL 100 MCG: 10 MG/ML VIAL (ML) INJECTION at 11:38:00

## 2024-05-10 RX ADMIN — DEXAMETHASONE SODIUM PHOSPHATE 4 MG: 4 MG/ML VIAL (ML) INJECTION at 11:50:00

## 2024-05-10 RX ADMIN — EPHEDRINE SULFATE 5 MG: 50 INJECTION INTRAVENOUS at 12:16:00

## 2024-05-10 RX ADMIN — ROCURONIUM BROMIDE 20 MG: 10 INJECTION, SOLUTION INTRAVENOUS at 13:06:00

## 2024-05-10 RX ADMIN — PHENYLEPHRINE HCL 100 MCG: 10 MG/ML VIAL (ML) INJECTION at 11:45:00

## 2024-05-10 RX ADMIN — LIDOCAINE HYDROCHLORIDE 50 MG: 10 INJECTION, SOLUTION EPIDURAL; INFILTRATION; INTRACAUDAL; PERINEURAL at 11:30:00

## 2024-05-10 RX ADMIN — ROCURONIUM BROMIDE 20 MG: 10 INJECTION, SOLUTION INTRAVENOUS at 12:11:00

## 2024-05-10 RX ADMIN — ROCURONIUM BROMIDE 10 MG: 10 INJECTION, SOLUTION INTRAVENOUS at 13:42:00

## 2024-05-10 RX ADMIN — ONDANSETRON 4 MG: 2 INJECTION INTRAMUSCULAR; INTRAVENOUS at 13:59:00

## 2024-05-10 RX ADMIN — PHENYLEPHRINE HCL 100 MCG: 10 MG/ML VIAL (ML) INJECTION at 11:41:00

## 2024-05-10 RX ADMIN — CEFAZOLIN SODIUM 2 G: 1 INJECTION, POWDER, FOR SOLUTION INTRAMUSCULAR; INTRAVENOUS at 11:50:00

## 2024-05-10 RX ADMIN — ROCURONIUM BROMIDE 10 MG: 10 INJECTION, SOLUTION INTRAVENOUS at 12:37:00

## 2024-05-10 NOTE — CONSULTS
[facility] General Medicine Consult      Reason for consult:  Post op medical management     Consulted by:   Dr. Chaidez    PCP: Ximena Doran MD      History of Present Illness: Patient is a 58 year old male with PMH sig for HTN, HLD, and BPH who presents s/p L VATS for carcinoid tumor.  He tolerated the procedure well without immediate complications.  Pt c/o post op pain, no nausea.  He did not take his BP medications this AM.       PMH:  Past Medical History:    Anxiety    BPH (benign prostatic hyperplasia)    GERD    Gout    High cholesterol    HTN (hypertension)    Hyperlipidemia    MARLENE (obstructive sleep apnea)    [place]g at [facility] 2/5/10 AHI 23 with significant O2 desaturation    Sleep apnea        PSH:  Past Surgical History:   Procedure Laterality Date    Other surgical history  8/26/11    cysto flow u/s-Dr. Lantigua    Other surgical history  12/6/13    Flow US - Dr. Lantigua    Other surgical history  11/21/14    Flow US - Dr. Lantigua    Other surgical history  12/28/15    Flow US - Dr. Lantigua    Other surgical history  1/20/17    flow us dr lantigua    Other surgical history  02/19/2019    Cystoscopy- Dr. Lantigua    Other surgical history  01/12/2021    Flow us - Dr. Cintron     Upper gi endoscopy,diagnosis  10/5/2011    Performed by DHIRAJ CASTANEDA at [facility], [facility]        Home Medications:  Outpatient Medications Marked as Taking for the 5/10/24 encounter (Hospital Encounter)   Medication Sig Dispense Refill    Coenzyme Q10 (CO Q 10 OR) Take by mouth.      CITALOPRAM 20 MG Oral Tab TAKE 1 TABLET DAILY (Patient taking differently: Take 0.5 tablets (10 mg total) by mouth daily.) 90 tablet 0    tamsulosin (FLOMAX) cap Take 1 capsule (0.4 mg total) by mouth daily. Due for yearly follow up. Please schedule to continue receiving refills. 90 capsule 0    lisinopril 40 MG Oral Tab Take 1 tablet (40 mg total) by mouth daily.      Multiple Vitamin (MULTI-VITAMIN DAILY) Oral Tab Take by mouth.      aspirin 81 MG Oral Chew Tab  Chew 1 tablet (81 mg total) by mouth daily. 100 tablet 3    rosuvastatin 5 MG Oral Tab Take 1 tablet (5 mg total) by mouth nightly. 90 tablet 3    chlorthalidone 25 MG Oral Tab Take 0.5 tablets (12.5 mg total) by mouth daily. 45 tablet 3    Vitamin D3 (VITAMIN D3) 1000 UNITS Oral Tab Take 1 tablet (1,000 Units total) by mouth daily.         Scheduled Medication:   [START ON 5/11/2024] aspirin  81 mg Oral Daily    [START ON 5/11/2024] lisinopril  40 mg Oral Daily    rosuvastatin  5 mg Oral Nightly    heparin  5,000 Units Subcutaneous Q8H PHILLY    acetaminophen  1,000 mg Intravenous Q6H    ceFAZolin  2 g Intravenous Q8H    ketorolac  15 mg Intravenous Q6H     Continuous Infusing Medication:   sodium chloride 60 mL/hr at 05/10/24 1514     PRN Medication:  dexamethasone    trimethobenzamide    HYDROmorphone    HYDROcodone-acetaminophen **OR** HYDROcodone-acetaminophen    atropine    naloxone    oxyCODONE **OR** oxyCODONE    HYDROmorphone **OR** HYDROmorphone    polyethylene glycol (PEG 3350)    sennosides    bisacodyl    fleet enema    ondansetron    prochlorperazine    calcium carbonate    ipratropium-albuterol     ALL:  No Known Allergies     Soc Hx:  Social History     Tobacco Use    Smoking status: Former     Types: Cigarettes    Smokeless tobacco: Never    Tobacco comments:     Once or twice yearly   Substance Use Topics    Alcohol use: Yes     Alcohol/week: 1.0 standard drink of alcohol     Types: 1 Standard drinks or equivalent per week     Comment: rare        Fam Hx  Family History   Problem Relation Age of Onset    Heart Disorder Father     Heart Disorder Mother         stroke    Hypertension Brother     Diabetes Brother     Cancer Maternal Grandmother         lung       Review of Systems  Comprehensive ROS reviewed and negative except for what's stated above.  Including negative for chest pain, shortness of breath, syncope.       OBJECTIVE:  /80   Pulse 101   Temp 97 °F (36.1 °C) (Temporal)   Resp 18    Ht 5' 10\" (1.778 m)   Wt 193 lb (87.5 kg)   SpO2 100%   BMI 27.69 kg/m²   Gen: No acute distress, alert and oriented x3, no focal neurologic deficits  HEENT:  EOMI, PERRLA, OP clear, MMM  Pulm: Decreased breath sounds on the L lower lung field - chest tube in place, normal respiratory effort  CV: Heart with regular rate and rhythm, no murmur.  Normal PMI.    Abd: Abdomen soft, nontender, nondistended, no organomegaly, bowel sounds present  MSK: Full range of motion in extremities, no clubbing, no cyanosis  Skin: no rashes or lesions  Neuro:  Grossly intact, no sensory deficits     Diagnostics:   CBC/Chem  No results for input(s): \"WBC\", \"HGB\", \"MCV\", \"PLT\", \"BAND\", \"INR\" in the last 168 hours.    Invalid input(s): \"LYM#\", \"MONO#\", \"BASOS#\", \"EOSIN#\"    No results for input(s): \"NA\", \"K\", \"CL\", \"CO2\", \"BUN\", \"CREATSERUM\", \"GLU\", \"CA\", \"CAION\", \"MG\", \"PHOS\" in the last 168 hours.    No results for input(s): \"ALT\", \"AST\", \"ALB\", \"AMYLASE\", \"LIPASE\", \"LDH\" in the last 168 hours.    Invalid input(s): \"ALPHOS\", \"TBIL\", \"DBIL\", \"TPROT\"    Radiology: PET GA68 NEUROENDOCRINE TUMOR (CPT=78815)    Result Date: 5/6/2024  PROCEDURE: PET/CT GALLIUM-68 WHOLE BODY (CPT=78815)  COMPARISON: None.  INDICATIONS: Initial treatment strategy.D3A.8 Neuroendocrine tumor (HCC).  4/15/24 left upper lobe endobronchial biopsy and cytology demonstrating neuroendocrine tumor.  TECHNIQUE: PET/CT study of the head and torso, done with 4.2 millicuries Gallium 68 Dotatate injected into a left antecubital vein.  Low dose non-contrast CT scanning was performed using a dedicated integrated PET/CT scanner for attenuation correction and anatomic localization only.  UPTAKE TIME: 48 minutes.   FINDINGS: HEAD/NECK: Normal.  Physiologic pituitary gland and thyroid activity seen. No pathologic Gallium 68 Dotatate uptake.  LUNGS: Ovoid smooth 14 x 21 mm Gallium 68 Dotatate positive focus in the apical-posterior segment of the left upper lobe corresponding  to site of recently biopsied neuroendocrine tumor.  Possible endobronchial component (series 4, image 150) as there is concave deformity of the left upper lobe bronchus.  No satellite nodules.  No pleural effusion.  MEDIASTINUM/JOYCE: No mediastinal or hilar lymphadenopathy.  Faint coronary artery calcifications.  No pathologic Gallium 68 Dotatate uptake.  CHEST WALL/AXILLA: No axillary or clavicular lymphadenopathy.  No pathologic Gallium 68 Dotatate uptake.  ABDOMEN/PELVIS: Atherosclerosis of the abdominal aorta.  The prostate is enlarged 58 mm in transverse diameter. The usual distribution of radiotracer is seen in the liver, spleen, adrenal glands and kidneys. No pathologic Gallium 68 Dotatate uptake.  MUSCULOSKELETAL: Scattered mild degenerative change within the spine.  No pathologic Gallium 68 Dotatate uptake.  No suspicious lesions on CT imaging.  OTHER: Negative.          CONCLUSION: There is history of recently biopsied left upper lobe pulmonary neuroendocrine tumor.  In the left upper lobe there is a 14 x 21 mm PET avid focus compatible with site of recent biopsy.  No evidence for additional intrapulmonary metastasis or  distant metastatic disease    Dictated by (CST): Byron Lopez MD on 5/06/2024 at 9:53 AM     Finalized by (CST): Byron Lopez MD on 5/06/2024 at 10:00 AM               ASSESSMENT / PLAN:    58 yr old male with PMH sig for HTN, HLD, and BPH who presents s/p L VATS for carcinoid tumor.    # Carcinoid tumor of lung   s/p L VATS  Post op care per CT surger  Encourage IS use    # Essential HTN  controlled  Resume lisinopril and chlorthalidone in AM    # HLD  Cont statin    # BPH  Cont flomax    # Generalized anxiety d/o  - cont citalopram     Outpatient records reviewed confirming patient's medical history and medications.     Further recommendations pending patient's clinical course.  [facility] hospitalist to continue to follow patient while in house    Patient and/or patient's family given  opportunity to ask questions and note understanding and agreeing with therapeutic plan as outlined      Thank you for allowing me to participate in the care of this patient.     Thank You,    Aquilino Grimaldo DO  [facility]  Answering Service number: [phone]

## 2024-05-10 NOTE — INTERVAL H&P NOTE
Pre-op Diagnosis: carcinoid tumor    The above referenced H&P was reviewed by Yamileth Amaya PA-C on 5/10/2024, the patient was examined and no significant changes have occurred in the patient's condition since the H&P was performed.  We discussed with the patient and/or legal representative the potential benefits, risks and side effects of this procedure; the likelihood of the patient achieving goals; and potential problems that might occur during recuperation.  We discussed reasonable alternatives to the procedure, including risks, benefits and side effects related to the alternatives and risks related to not receiving this procedure.  We will proceed with procedure as planned.

## 2024-05-10 NOTE — PLAN OF CARE
Assumed care of patient at 1500. Pt complains of pain and receives pain medication for pain. Alert and oriented x4. Pt has full strength. Pt has a chest tube at -20mm Hg to continuous suction. Pt has bloody drainage in the chest tube and put out 120 ml since 1500. Pt has a garza for elimination. Pt is bed rest. Pt has the call light and his belongings within reach. Pt has nonskid socks on. Pt was weaned off oxygen and is on room air from nasal canula. SBP is between  which is goal. For assessments and vitals see documentation.       Problem: CARDIOVASCULAR - ADULT  Goal: Maintains optimal cardiac output and hemodynamic stability  Description: INTERVENTIONS:  - Monitor vital signs, rhythm, and trends  - Monitor for bleeding, hypotension and signs of decreased cardiac output  - Evaluate effectiveness of vasoactive medications to optimize hemodynamic stability  - Monitor arterial and/or venous puncture sites for bleeding and/or hematoma  - Assess quality of pulses, skin color and temperature  - Assess for signs of decreased coronary artery perfusion - ex. Angina  - Evaluate fluid balance, assess for edema, trend weights  Outcome: Progressing     Problem: SAFETY ADULT - FALL  Goal: Free from fall injury  Description: INTERVENTIONS:  - Assess pt frequently for physical needs  - Identify cognitive and physical deficits and behaviors that affect risk of falls.  - [place] fall precautions as indicated by assessment.  - Educate pt/family on patient safety including physical limitations  - Instruct pt to call for assistance with activity based on assessment  - Modify environment to reduce risk of injury  - Provide assistive devices as appropriate  - Consider OT/PT consult to assist with strengthening/mobility  - Encourage toileting schedule  Outcome: Progressing     Problem: PAIN - ADULT  Goal: Verbalizes/displays adequate comfort level or patient's stated pain goal  Description: INTERVENTIONS:  - Encourage pt to  monitor pain and request assistance  - Assess pain using appropriate pain scale  - Administer analgesics based on type and severity of pain and evaluate response  - Implement non-pharmacological measures as appropriate and evaluate response  - Consider cultural and social influences on pain and pain management  - Manage/alleviate anxiety  - Utilize distraction and/or relaxation techniques  - Monitor for opioid side effects  - Notify MD/LIP if interventions unsuccessful or patient reports new pain  - Anticipate increased pain with activity and pre-medicate as appropriate  Outcome: Progressing

## 2024-05-10 NOTE — ANESTHESIA PREPROCEDURE EVALUATION
PRE-OP EVALUATION    Patient Name: Lior Brown    Admit Diagnosis: LUNG CANCER    Pre-op Diagnosis: LUNG CANCER    FLEXIBLE BRONCHOSCOPY; VIDEO-ASSISTED THORACOSCOPIC SURGERY; LEFT UPPER LOBECTOMY; MEDIASTINAL LYMPH NODE DISSECTION; POSSIBLE THORACOTOMY    Anesthesia Procedure: FLEXIBLE BRONCHOSCOPY; VIDEO-ASSISTED THORACOSCOPIC SURGERY; LEFT UPPER LOBECTOMY; MEDIASTINAL LYMPH NODE DISSECTION; POSSIBLE THORACOTOMY (Right)    Surgeons and Role:     * Dm Laura, Sundeep BREWER MD - Primary    Pre-op vitals reviewed.        Body mass index is 27.69 kg/m².    Current medications reviewed.  Hospital Medications:  No current facility-administered medications on file as of 5/10/2024.       Outpatient Medications:     Medications Prior to Admission   Medication Sig Dispense Refill Last Dose    Coenzyme Q10 (CO Q 10 OR) Take by mouth.   5/3/2024    CITALOPRAM 20 MG Oral Tab TAKE 1 TABLET DAILY (Patient taking differently: Take 0.5 tablets (10 mg total) by mouth daily.) 90 tablet 0 5/9/2024    tamsulosin (FLOMAX) cap Take 1 capsule (0.4 mg total) by mouth daily. Due for yearly follow up. Please schedule to continue receiving refills. 90 capsule 0 5/9/2024    lisinopril 40 MG Oral Tab Take 1 tablet (40 mg total) by mouth daily.   5/9/2024    Multiple Vitamin (MULTI-VITAMIN DAILY) Oral Tab Take by mouth.   5/3/2024    aspirin 81 MG Oral Chew Tab Chew 1 tablet (81 mg total) by mouth daily. 100 tablet 3 5/5/2024    rosuvastatin 5 MG Oral Tab Take 1 tablet (5 mg total) by mouth nightly. 90 tablet 3 5/9/2024    chlorthalidone 25 MG Oral Tab Take 0.5 tablets (12.5 mg total) by mouth daily. 45 tablet 3 5/9/2024    Vitamin D3 (VITAMIN D3) 1000 UNITS Oral Tab Take 1 tablet (1,000 Units total) by mouth daily.   5/3/2024       Allergies: Patient has no known allergies.      Anesthesia Evaluation    Patient summary reviewed.    Anesthetic Complications  (-) history of anesthetic complications         GI/Hepatic/Renal      (+) GERD                            Cardiovascular                  (+) hypertension   (+) hyperlipidemia               (+) dysrhythmias and PVC                  Endo/Other                                  Pulmonary  Comment: Lung ca                  (+) sleep apnea       Neuro/Psych        (+) anxiety         (+) neuromuscular disease                     Past Surgical History:   Procedure Laterality Date    Other surgical history  8/26/11    cysto flow u/s-Dr. Lantigua    Other surgical history  12/6/13    Flow  - Dr. Lantigua    Other surgical history  11/21/14    Flow US - Dr. Lantigua    Other surgical history  12/28/15    Flow US - Dr. Lantigua    Other surgical history  1/20/17    flow  dr lantigua    Other surgical history  02/19/2019    Cystoscopy- Dr. Lantigua    Other surgical history  01/12/2021    Flow  - Dr. Cintron     Upper gi endoscopy,diagnosis  10/5/2011    Performed by DHIRAJ CASTANEDA at [facility]     Social History     Socioeconomic History    Marital status:    Tobacco Use    Smoking status: Former     Types: Cigarettes    Smokeless tobacco: Never    Tobacco comments:     Once or twice yearly   Vaping Use    Vaping status: Never Used   Substance and Sexual Activity    Alcohol use: Yes     Alcohol/week: 1.0 standard drink of alcohol     Types: 1 Standard drinks or equivalent per week     Comment: rare    Drug use: No     History   Drug Use No     Available pre-op labs reviewed.  Lab Results   Component Value Date    WBC 4.7 04/27/2024    RBC 4.50 04/27/2024    HGB 13.6 04/27/2024    HCT 40.1 04/27/2024    MCV 89.1 04/27/2024    MCH 30.2 04/27/2024    MCHC 33.9 04/27/2024    RDW 13.2 04/27/2024    .0 04/27/2024     Lab Results   Component Value Date     04/27/2024    K 3.6 04/27/2024     04/27/2024    CO2 32.0 04/27/2024    BUN 8 (L) 04/27/2024    CREATSERUM 1.04 04/27/2024     (H) 04/27/2024    CA 9.7 04/27/2024            Airway      Mallampati: II  Mouth opening: 3 FB  TM distance: 4 - 6  cm  Neck ROM: full Cardiovascular      Rhythm: regular  Rate: normal     Dental  Comment: No loose teeth reported by patient           Pulmonary      Breath sounds clear to auscultation bilaterally.               Other findings              ASA: 2   Plan: general  NPO status verified and patient meets guidelines.    Post-procedure pain management plan discussed with surgeon and patient.    Comment: GA with double lumen ETT, additional IV access, and arterial line discussed. Risk and benefits discussed and questions answered.    Discussed general anesthesia with endotracheal tube/supraglottic airway with patient. Discussed risk of oral/dental trauma, nausea, rare allergic reactions. Patient understands the risks, benefits, and requirement for anesthesia and willing to proceed. Consent signed.          Plan/risks discussed with: patient                Present on Admission:  **None**

## 2024-05-10 NOTE — ANESTHESIA PROCEDURE NOTES
Airway  Date/Time: 5/10/2024 11:32 AM  Urgency: elective    Airway not difficult    General Information and Staff    Patient location during procedure: OR  Anesthesiologist: Miles Velez MD  Performed: anesthesiologist   Performed by: Miles Velez MD  Authorized by: Miles Velez MD      Indications and Patient Condition  Indications for airway management: anesthesia  Sedation level: deep  Preoxygenated: yes  Patient position: sniffing  Mask difficulty assessment: 1 - vent by mask    Final Airway Details  Final airway type: endotracheal airway      Successful airway: ETT and ETT - double lumen left  Cuffed: yes   Successful intubation technique: direct laryngoscopy  Endotracheal tube insertion site: oral  Blade: Cinthya  Blade size: #3  ETT DL size (fr): 37    Cormack-Lehane Classification: grade I - full view of glottis  Placement verified by: capnometry   Cuff volume (mL): 9  Measured from: lips  ETT to lips (cm): 29  Number of attempts at approach: 1

## 2024-05-10 NOTE — ANESTHESIA PROCEDURE NOTES
Arterial Line    Date/Time: 5/10/2024 11:33 AM    Performed by: Miles Velez MD  Authorized by: Miles Velez MD    General Information and Staff    Procedure Start:  5/10/2024 11:33 AM  Procedure End:  5/10/2024 11:36 AM  Anesthesiologist:  Miles Velez MD  Performed By:  Anesthesiologist  Patient Location:  OR  Indication: continuous blood pressure monitoring and blood sampling needed    Site Identification: surface landmarks    Preanesthetic Checklist: 2 patient identifiers, IV checked, risks and benefits discussed, monitors and equipment checked, pre-op evaluation, timeout performed, anesthesia consent and sterile technique used    Procedure Details    Catheter Size:  20 G  Catheter Length:  1 and 3/4 inch  Catheter Type:  Arrow  Seldinger Technique?: Yes    Laterality:  Left  Site:  Radial artery  Site Prep: chlorhexidine    Line Secured:  Wrist Brace, tape and Tegaderm    Assessment    Events: patient tolerated procedure well with no complications      Medications  5/10/2024 11:33 AM      Additional Comments

## 2024-05-10 NOTE — BRIEF OP NOTE
Pre-Operative Diagnosis: carcinoid tumor     Post-Operative Diagnosis: carcinoid tumor     Procedure Performed:   FLEXIBLE BRONCHOSCOPY; VIDEO-ASSISTED THORACOSCOPIC SURGERY; LEFT UPPER LOBECTOMY; MEDIASTINAL LYMPH NODE DISSECTION    Surgeons and Role:     * Sundeep Chaidez Jr., MD - Primary    Assistant(s):  Yamileth Mckenna PA-C     Surgical Findings: as expected, no pleural mets     Specimen:   ID Type Source Tests Collected by Time Destination   1 : Lymph nodes level 5 Tissue Lymph node SURGICAL PATHOLOGY TISSUE Sundeep Chaidez Jr., MD 5/10/2024 1226    2 : Lymph nodes level 6 Tissue Lymph node SURGICAL PATHOLOGY TISSUE Sundeep Chaidez Jr., MD 5/10/2024 1227    3 : Lymph nodes level 11 Tissue Lymph node SURGICAL PATHOLOGY TISSUE Sundeep Chaidez Jr., MD 5/10/2024 1241    4 : lymph nodes level 12 Tissue Lymph node SURGICAL PATHOLOGY TISSUE Sundeep Chaidez Jr., MD 5/10/2024 1325    5 : LEFT UPPER LOBE Tissue Lung left SURGICAL PATHOLOGY TISSUE Sundeep Chaidez Jr., MD 5/10/2024 1401    6 : Level 7 lymph nodes Tissue Lymph node SURGICAL PATHOLOGY TISSUE Sundeep Chaidez Jr., MD 5/10/2024 1404      Estimated Blood Loss: Blood Output: 100 mL (5/10/2024  2:26 PM)    Disposition: ICU    Yamileth Amaya PA-C  5/10/2024  2:52 PM

## 2024-05-10 NOTE — ANESTHESIA PROCEDURE NOTES
Peripheral IV  Date/Time: 5/10/2024 11:37 AM  Inserted by: Miles Velez MD    Placement  Needle size: 18 G  Laterality: right  Location: hand  Local anesthetic: none  Site prep: alcohol  Technique: anatomical landmarks  Attempts: 1

## 2024-05-10 NOTE — ANESTHESIA POSTPROCEDURE EVALUATION
[facility] Patient Status:  Inpatient   Age/Gender 58 year old male MRN [id number]   Location [facility] 6NE-A Attending Sundeep Chaidez Jr., MD   Hosp Day # 0 PCP Ximena Doran MD       Anesthesia Post-op Note    FLEXIBLE BRONCHOSCOPY; VIDEO-ASSISTED THORACOSCOPIC SURGERY; LEFT UPPER LOBECTOMY; MEDIASTINAL LYMPH NODE DISSECTION; POSSIBLE THORACOTOMY    Procedure Summary       Date: 05/10/24 Room / Location:  CVOR 03 HYBRID /  CVOR    Anesthesia Start: 1124 Anesthesia Stop: 1455    Procedure: FLEXIBLE BRONCHOSCOPY; VIDEO-ASSISTED THORACOSCOPIC SURGERY; LEFT UPPER LOBECTOMY; MEDIASTINAL LYMPH NODE DISSECTION; POSSIBLE THORACOTOMY (Right) Diagnosis: (LUNG CANCER)    Surgeons: Sundeep Chaidez Jr., MD Anesthesiologist: Miles Velez MD    Anesthesia Type: general ASA Status: 2            Anesthesia Type: general    Vitals Value Taken Time   /63 05/10/24 1454   Temp 97.9 05/10/24 1454   Pulse 105 05/10/24 1453   Resp 17 05/10/24 1453   SpO2 100 % 05/10/24 1453   Vitals shown include unfiled device data.    Patient Location: ICU    Anesthesia Type: general    Airway Patency: patent    Postop Pain Control: adequate    Mental Status: mildly sedated but able to meaningfully participate in the post-anesthesia evaluation    Nausea/Vomiting: none    Cardiopulmonary/Hydration status: stable euvolemic    Complications: no apparent anesthesia related complications    Postop vital signs: stable    Dental Exam: Unchanged from Preop    Sign out given to ICU staff.

## 2024-05-11 PROCEDURE — 94799 UNLISTED PULMONARY SVC/PX: CPT

## 2024-05-11 PROCEDURE — 97161 PT EVAL LOW COMPLEX 20 MIN: CPT

## 2024-05-11 PROCEDURE — 97530 THERAPEUTIC ACTIVITIES: CPT

## 2024-05-11 NOTE — PLAN OF CARE
Assumed patient care this morning.   Alert, awake. Breathing unlabored. Chest tube to suction with expiratory air leak, reviewed with surgery PA, chest tube to water seal today. Encouraged IS use. Up in chair, ambulating. Tolerating oral intake. Cleared to transfer out of ICU, awaiting bed placement.

## 2024-05-11 NOTE — PROGRESS NOTES
Thoracic Surgery Progress Note     Lior Brown is a 58 year old male. MRN [id number]. Admitted 5/10/2024    SUBJECTIVE/24H EVENTS:     No acute events overnight. Doing well. Has some chest tube discomfort. Tolerating a diet. Has not used IS. Up in the chair.       Objective:     VITALS:     Temp (24hrs), Av °F (36.7 °C), Min:97 °F (36.1 °C), Max:98.6 °F (37 °C)   /72   Pulse 81   Temp 98.6 °F (37 °C) (Temporal)   Resp 21   Ht 177.8 cm (5' 10\")   Wt 193 lb (87.5 kg)   SpO2 100%   BMI 27.69 kg/m²     EXAM:   General: well appearing male in no acute distress  Heart: regular rate and rhythm.   Lungs: Normal respiratory effort. Equal chest rise bilaterally  Incisions: c/d/i   Chest tube:   Air Leak: Expiratory1  Output: serosanguineous   24 hour: 250 cc  Suction: yes  Abdomen: Soft, Non-tender, non-distended.  Extremities: No clubbing or cyanosis. No lateralizing weakness  Neuro: no focal defects  Psych:  oriented to person place and time, normal mood and affect      Intake/Output Summary (Last 24 hours) at 2024 0906  Last data filed at 2024 0800  Gross per 24 hour   Intake 1521 ml   Output 1550 ml   Net -29 ml         MEDS:   aspirin  81 mg Oral Daily    lisinopril  40 mg Oral Daily    rosuvastatin  5 mg Oral Nightly    heparin  5,000 Units Subcutaneous Q8H PHILLY    acetaminophen  1,000 mg Intravenous Q6H    ketorolac  15 mg Intravenous Q6H    chlorthalidone  12.5 mg Oral Daily    escitalopram  5 mg Oral Daily    tamsulosin  0.4 mg Oral Daily    cholecalciferol  1,000 Units Oral Daily         Assessment/Plan:     58 year old male 1 day post op from left VATS upper lobectomy.     -Chest tube to water seal.   -D/c fluids, d/c garza, d/c art line.   -General diet.   -Transfer to floor.   -Pain control.   -Maintain saturations above 90%.   -Ambulate 3-4 times per day in the halls. Spend majority of day out of bed, in chair. Encouraged cough and IS use 10x hourly. Instructed how to use IS.        Yamileth Amaya PA-C  Thoracic Surgery  Pager: [phone]

## 2024-05-11 NOTE — PROGRESS NOTES
[facility] Hospitalist Progress Note                                                                     [facility]  6/25/1965    SUBJECTIVE: no chest pain, palpitations, shortness of breath, cough, nausea, vomiting, abdominal pain. Post operative pain controlled.     OBJECTIVE:  Temp:  [97 °F (36.1 °C)-98.5 °F (36.9 °C)] 98.2 °F (36.8 °C)  Pulse:  [] 67  Resp:  [13-20] 14  BP: ()/(68-97) 115/73  SpO2:  [94 %-100 %] 100 %  AO: ()/(53-94) 115/56  Exam  Gen: No acute distress, alert and oriented  Pulm: Lungs clear bilaterally, normal respiratory effort, no crackles, no wheezing, chest tube in place  CV: Heart with regular rate and rhythm, no murmur.    Abd: Abdomen soft, nontender, nondistended, bowel sounds present  MSK: No significant pitting edema or tenderness of the LE  Skin: no new rashes or lesions    Labs:   No results for input(s): \"WBC\", \"HGB\", \"MCV\", \"PLT\", \"BAND\", \"INR\" in the last 168 hours.    Invalid input(s): \"LYM#\", \"MONO#\", \"BASOS#\", \"EOSIN#\"    No results for input(s): \"NA\", \"K\", \"CL\", \"CO2\", \"BUN\", \"CREATSERUM\", \"CA\", \"CAION\", \"MG\", \"PHOS\", \"GLU\" in the last 168 hours.    No results for input(s): \"ALT\", \"AST\", \"ALB\", \"AMYLASE\", \"LIPASE\", \"LDH\" in the last 168 hours.    Invalid input(s): \"ALPHOS\", \"TBIL\", \"DBIL\", \"TPROT\"    No results for input(s): \"PGLU\" in the last 168 hours.    Meds:   Scheduled:    aspirin  81 mg Oral Daily    lisinopril  40 mg Oral Daily    rosuvastatin  5 mg Oral Nightly    heparin  5,000 Units Subcutaneous Q8H PHILLY    acetaminophen  1,000 mg Intravenous Q6H    ketorolac  15 mg Intravenous Q6H    chlorthalidone  12.5 mg Oral Daily    escitalopram  5 mg Oral Daily    tamsulosin  0.4 mg Oral Daily    cholecalciferol  1,000 Units Oral Daily     Continuous Infusions:    sodium chloride 60 mL/hr at 05/10/24 1514     PRN:   oxyCODONE **OR** oxyCODONE    HYDROmorphone  **OR** HYDROmorphone    polyethylene glycol (PEG 3350)    sennosides    bisacodyl    fleet enema    ondansetron    prochlorperazine    calcium carbonate    ipratropium-albuterol    ASSESSMENT / PLAN:     58 yr old male with PMH sig for HTN, HLD, and BPH who presents s/p L VATS for carcinoid tumor.     # Carcinoid tumor of lung   s/p L VATS  Post op care per CT surger  Encourage IS use     # Essential HTN  controlled  Resume lisinopril and chlorthalidone in AM     # HLD  Cont statin     # BPH  Cont flomax     # Generalized anxiety d/o  - cont citalopram      Dispo: per thoracic surgery     Ag Guillen MD  [facility] Emergencyist  Answering Service number: [phone]

## 2024-05-11 NOTE — PLAN OF CARE
Assumed care of pt at [facility] [address]. VSS, NSR on tele. RA during day, CPAP set up by RT for night. Arterial line positional, removed in AM. Garcia and chest tube remain intact, see outputs. Patient c/o slight nausea when attempting to eat late dinner, zofran given, nausea subsided. Patient reports that pain was managed well overnight with ofirmev and Toradol.     Upon handoff with Day RN, expiratory air leak assessed.    Problem: Patient/Family Goals  Goal: Patient/Family Long Term Goal  Description: Patient's Long Term Goal: to go home    Interventions:  - ambulate in hallway  - See additional Care Plan goals for specific interventions  Outcome: Progressing  Goal: Patient/Family Short Term Goal  Description: Patient's Short Term Goal: manage pain    Interventions:   - PRN pain medication available  - See additional Care Plan goals for specific interventions  Outcome: Progressing     Problem: CARDIOVASCULAR - ADULT  Goal: Maintains optimal cardiac output and hemodynamic stability  Description: INTERVENTIONS:  - Monitor vital signs, rhythm, and trends  - Monitor for bleeding, hypotension and signs of decreased cardiac output  - Evaluate effectiveness of vasoactive medications to optimize hemodynamic stability  - Monitor arterial and/or venous puncture sites for bleeding and/or hematoma  - Assess quality of pulses, skin color and temperature  - Assess for signs of decreased coronary artery perfusion - ex. Angina  - Evaluate fluid balance, assess for edema, trend weights  Outcome: Progressing     Problem: SAFETY ADULT - FALL  Goal: Free from fall injury  Description: INTERVENTIONS:  - Assess pt frequently for physical needs  - Identify cognitive and physical deficits and behaviors that affect risk of falls.  - [place] fall precautions as indicated by assessment.  - Educate pt/family on patient safety including physical limitations  - Instruct pt to call for assistance with activity based on assessment  - Modify  environment to reduce risk of injury  - Provide assistive devices as appropriate  - Consider OT/PT consult to assist with strengthening/mobility  - Encourage toileting schedule  Outcome: Progressing     Problem: PAIN - ADULT  Goal: Verbalizes/displays adequate comfort level or patient's stated pain goal  Description: INTERVENTIONS:  - Encourage pt to monitor pain and request assistance  - Assess pain using appropriate pain scale  - Administer analgesics based on type and severity of pain and evaluate response  - Implement non-pharmacological measures as appropriate and evaluate response  - Consider cultural and social influences on pain and pain management  - Manage/alleviate anxiety  - Utilize distraction and/or relaxation techniques  - Monitor for opioid side effects  - Notify MD/LIP if interventions unsuccessful or patient reports new pain  - Anticipate increased pain with activity and pre-medicate as appropriate  Outcome: Progressing

## 2024-05-11 NOTE — OPERATIVE REPORT
[facility]    PATIENT'S NAME: JOSEY JURADO   ATTENDING PHYSICIAN: Sundeep Chaidez Jr, MD   OPERATING PHYSICIAN: Sundeep Chaidez Jr, MD   PATIENT ACCOUNT#:   [id number]    LOCATION:  [address] [place] [address]  MEDICAL RECORD #:   [id number]       YOB: 1965  ADMISSION DATE:       05/10/2024      OPERATION DATE:  05/10/2024    OPERATIVE REPORT      PREOPERATIVE DIAGNOSIS:  Left upper lobe carcinoid tumor.  POSTOPERATIVE DIAGNOSIS:  Left upper lobe carcinoid tumor.  PROCEDURE:    1.   Flexible bronchoscopy.  2.   Left video-assisted thoracoscopic exploration.  3.   Left upper lobectomy.  4.   Mediastinal lymph node dissection.  5.   Multilevel intercostal nerve block.    ASSISTANT SURGEON:  Cristhian Abdi DO     ASSISTANT:  Yamileth Amaya PA-C     ANESTHESIA:  General dual-lumen endotracheal anesthesia.      ANESTHESIOLOGIST:  Miles Velez MD    SPECIMENS:  Left upper lobe; level 5, 6, 7, 11, and 12 lymph nodes.    DRAINS:  28-Spanish chest tube x1.      IMPLANTS:  None.    ESTIMATED BLOOD LOSS:  100 mL.    COMPLICATIONS:  None.    CONDITION:  Stable, to PACU.     INDICATIONS:  The patient is a 58-year-old man who had a lung mass found on imaging.  Bronchoscopy showed an endobronchial lesion in the upper division bronchus of his left upper lobe consistent with carcinoid, and a dotatate PET showed this to be localized.  He was brought to the operating room for definitive surgical resection.      FINDINGS:  The tumor could be seen in the upper division bronchus and preoperative bronchoscopy, giving me a high degree of confidence of complete resection with left upper lobectomy.  The pleura and lymph nodes were grossly normal in appearance.    OPERATIVE TECHNIQUE:  Patient was brought to the operating room, laid supine, and underwent general dual-lumen endotracheal anesthesia.  I performed a flexible bronchoscopy.  The trachea, mainstem bronchi, lobar and segmental bronchi were examined  bilaterally.  This was a normal bronchoscopy on the right, but on the left, I could see endobronchial tumor in the upper division bronchus of the left upper lobe.  It was approximately 5 mm from the bifurcation of the upper division from the lingular bronchus.  Seeing this confirmed the need for left upper lobectomy for complete resection with negative margins.  He was then placed in right lateral decubitus position.  All pressure points were padded.  He was prepped and draped in usual sterile fashion.  A time-out was performed to confirm patient, side, and site of surgery.  I made a 2 cm incision in the inframammary crease in the midclavicular line and entered the chest bluntly.  A camera through this incision revealed I was safely in the chest space.  I then made 3 additional incisions, which were my standard incisions for a left upper lobectomy.  An Von wound retractor was placed at the superior-most incision for removal of specimens.  I then explored the left pleural space.  There was no sign of pleural disease.  I performed a multilevel intercostal nerve block.  I used 60 mL of 0.25% Marcaine; 5 mL was injected in each interspace 2 through 9 under direct visualization with thoracoscope for postoperative pain control.  I then began to dissect out the lymph nodes from the AP window.  I took down the mediastinal pleural reflection from the superior to the superior pulmonary vein and began to work cephalad towards the aorta.  Level 5 lymph nodes were found in this area, bluntly dissected free, and sent for pathology.  Working cephalad, I found level 6 lymph nodes which were likewise bluntly dissected free and sent for pathology.  I avoided electrocautery in this area so as to protect the phrenic and recurrent laryngeal nerves.  I did leave a Nu-Knit at this site for additional hemostasis.  I turned my attention to the superior pulmonary vein.  I began to do circumferential and sharp dissection around this.  I  confirmed the presence and safety of the inferior vein.  Metzenbaum scissors were used to dissect behind the vein, and a right-angle clamp was used to create a tunnel behind the vein.  Then, an Ethicon 35 mm powered vascular stapler was used to transect the superior pulmonary vein.  With this taken, I could see a level 11 lymph node sitting in front of the truncus anterior branch of the artery.  This lymph node was bluntly dissected free and sent for pathology.  I then did sharp dissection around the truncus anterior branch of the artery.  It had an early bifurcation heading posteriorly, and I dealt with this branch separately.  With the branch closest to me, I did circumferential and sharp dissection around, followed by tunnel creation with the right-angle clamp, and then transection with the powered vascular stapler.  This process was repeated for the branch heading posteriorly.  With these taken, I turned my attention to the left upper lobe bronchus.  I dissected at the bifurcation of the upper and lower lobe bronchi, and in this area, there was a level 11 lymph node.  This was bluntly dissected free and sent for pathology.  This gave me a good view of the space between the artery and the bronchus, and I dissected in this space to separate the bronchus from the pulmonary artery running behind it.  Once I was around it, I created a tunnel with a right-angle clamp and then transected the left upper lobe bronchus with a gold load of the 45 mm stapler.  I then lifted up the stump of the artery and could see additional branches.  I could see a very small branch more proximally artery, as well as 2 additional branches going to the upper lobe.  One of these was the lingular artery which was difficult to approach this, and so I began to separate the fissure in order to have a safer approach.  I dissected down the artery going to the lower lobe just beyond the lingular branch and then placed the anvil of my 45 mm stapler  in this tunnel and transected the tissues of the fissure.  In doing this, I found a level 12 lymph node, which was bluntly dissected free and sent for pathology.  I then did circumferential and sharp dissection around the lingular branch of the artery, as well as the other branch that was immediately proximal to it.  I was able to create a tunnel that connected both of these and then transected them both with 1 fire of the powered vascular stapler.  The last small branch was also addressed with 1 load of the powered vascular stapler.  This was the hilar attachment of the upper lobe.  The superior segmental branch of the artery was identified and protected.  I then  the remainder of the fissural tissues using additional gold loads of the 45 mm stapler.  This  the entirety of the upper lobe, which was then placed in an [place] bag and removed from the chest.  I then finished my lymph node dissection by dissecting on the inferior edge of the bronchus heading medially.  Doing this, I found level 7 lymph nodes which were bluntly dissected free and sent for pathology.  A Nu-Knit was left at this site for additional hemostasis.  I then irrigated with warm water irrigation.  I checked for a bronchial stump air leak and for bleeding.  No air leak was seen, and any sites of oozing were addressed with electrocautery until there were no signs of any bleeding.  I then placed a 28-Bruneian chest tube posteriorly and secured it using 0 silk sutures.  Finally, I asked the anesthesiologist to reinflate the lung.  It reinflated well and filled the chest space.  I then withdrew my camera and all instruments and closed the remaining incisions in 3 layers with 2 layers of 2-0 Vicryl and 1 of 4-0 Monocryl.  Patient tolerated the procedure well.  I was present for the entirety of the procedure.    Dictated By Sundeep Chaidez Jr, MD  d: 05/10/2024 15:23:28  t: 05/10/2024 19:07:18  Job [id number]  JLL/

## 2024-05-11 NOTE — PHYSICAL THERAPY NOTE
PHYSICAL THERAPY EVALUATION - INPATIENT     Room Number: 6620/6620-A  Evaluation Date: 5/11/2024  Type of Evaluation: Initial  Physician Order: PT Eval and Treat    Presenting Problem: left upper lobe neuroendocrine tumor. S/p 5/10  FLEXIBLE BRONCHOSCOPY; VIDEO-ASSISTED THORACOSCOPIC SURGERY; LEFT UPPER LOBECTOMY; MEDIASTINAL LYMPH NODE DISSECTION; POSSIBLE THORACOTOMY (Right)  Co-Morbidities : Gout, GERD, HTN, Anxiety  Reason for Therapy: Mobility Dysfunction and Discharge Planning    PHYSICAL THERAPY ASSESSMENT   Patient is currently functioning near baseline with bed mobility, transfers, and gait.  Prior to admission, patient's baseline is IND with ADLs and mobility.  At this time pt close to baseline and anticipate that pt will continue to improve as pt recovers.     Patient will benefit from continued skilled PT Services For duration of hospitalization, however, given the patient is functioning near baseline level do not anticipate skilled therapy needs at discharge .    PLAN                Patient has met all skilled IPPT goals at this time. Patient will be discharged from Physical Therapy services.  Please re-order if a new functional limitation presents during this admission.        PHYSICAL THERAPY MEDICAL/SOCIAL HISTORY  History related to current admission: Patient is a 58 year old male admitted on 5/10/2024 from Home for S/p 5/10  FLEXIBLE BRONCHOSCOPY; VIDEO-ASSISTED THORACOSCOPIC SURGERY; LEFT UPPER LOBECTOMY; MEDIASTINAL LYMPH NODE DISSECTION; POSSIBLE THORACOTOMY (Right).        HOME SITUATION  Type of Home: House   Home Layout: Two level        Stairs to Bedroom: 12  Railing: Yes    Lives With: Spouse  Drives: Yes  Patient Owned Equipment: Rolling walker  Patient Regularly Uses: Glasses    Prior Level of [place]: Pt states that he lives in a house with his spouse. Pt reports that he is IND with al ADLs and mobility.     SUBJECTIVE  \"I feel ok today\"      OBJECTIVE  Precautions: Chest  tube  Fall Risk: Standard fall risk    WEIGHT BEARING RESTRICTION  Weight Bearing Restriction: None                PAIN ASSESSMENT  Ratin  Location: Pt reports no pain       COGNITION  Overall Cognitive Status:  WFL - within functional limits    RANGE OF MOTION AND STRENGTH ASSESSMENT  Upper extremity ROM and strength are within functional limits     Lower extremity ROM is within functional limits     Lower extremity strength is within functional limits       BALANCE  Static Sitting: Good  Dynamic Sitting: Good  Static Standing: Fair +  Dynamic Standing: Fair +    ADDITIONAL TESTS                                    ACTIVITY TOLERANCE                         O2 WALK       NEUROLOGICAL FINDINGS                        AM-PAC '6-Clicks' INPATIENT SHORT FORM - BASIC MOBILITY  How much difficulty does the patient currently have...  Patient Difficulty: Turning over in bed (including adjusting bedclothes, sheets and blankets)?: A Little   Patient Difficulty: Sitting down on and standing up from a chair with arms (e.g., wheelchair, bedside commode, etc.): A Little   Patient Difficulty: Moving from lying on back to sitting on the side of the bed?: A Little   How much help from another person does the patient currently need...   Help from Another: Moving to and from a bed to a chair (including a wheelchair)?: A Little   Help from Another: Need to walk in hospital room?: A Little   Help from Another: Climbing 3-5 steps with a railing?: A Little       AM-PAC Score:  Raw Score: 18   Approx Degree of Impairment: 46.58%   Standardized Score (AM-PAC Scale): 43.63   CMS Modifier (G-Code): CK    FUNCTIONAL ABILITY STATUS  Gait Assessment   Functional Mobility/Gait Assessment  Gait Assistance: Modified independent  Distance (ft): 300  Assistive Device: None  Pattern: Within Functional Limits  Stairs: Stairs  How Many Stairs: 7  Device: 1 Rail  Assist: Contact guard assist  Pattern: Ascend and Descend  Ascend and Descend :  Reciprocal    Skilled Therapy Provided     Bed Mobility:  Rolling: NT  Supine to sit: NT   Sit to supine: NT     Transfer Mobility:  Sit to stand: Mod I   Stand to sit: Mod I  Gait = Mod I 300ft using no assisted device     Therapist's Comments: Pt was observed with no LOB when ambulating.     Exercise/Education Provided:  Bed mobility  Body mechanics  Strengthening  Transfer training    Patient End of Session: Up in chair;All patient questions and concerns addressed;Call light within reach;RN aware of session/findings;Family present      Patient Evaluation Complexity Level:  History Moderate - 1 or 2 personal factors and/or co-morbidities   Examination of body systems Low - addressing 1-2 elements   Clinical Presentation Low - Stable   Clinical Decision Making Low - Stable       PT Session Time: 23 minutes  Therapeutic Activity: 15 minutes

## 2024-05-11 NOTE — CONSULTS
Pulmonary / Critical Care H&P/Consult       NAME: Lior Brown - ROOM: [address] [place] [address] - MRN: [id number] - Age: 58 year old - :  1965    Date of Admission: 5/10/2024 10:10 AM  Admission Diagnosis: LUNG CANCER  Carcinoid tumor of left lung (HCC)    Reason for consult: carcinoid      History of Present Illness: 59 yo M with h/o KYRIE endobronchial carcinoid. He is now s/p KYRIE lobectomy. Tolerated well. Has h/o marlene. Tried using his cpap overnight, felt the pressure was a bit high.     Past Medical History:    Anxiety    BPH (benign prostatic hyperplasia)    GERD    Gout    High cholesterol    HTN (hypertension)    Hyperlipidemia    MARLENE (obstructive sleep apnea)    [facility]10 [facility] 23 with significant O2 desaturation    Sleep apnea      Past Surgical History:   Procedure Laterality Date    Other surgical history  11    cysto flow u/s-Dr. Lantigua    Other surgical history  13    Flow  - Dr. Lantigua    Other surgical history  14    Flow US - Dr. Lantigua    Other surgical history  12/28/15    Flow  - Dr. Lantigua    Other surgical history  17    flow  dr lantigua    Other surgical history  2019    Cystoscopy- Dr. Lantigua    Other surgical history  2021    Flow us - Dr. Cintron     Upper gi endoscopy,diagnosis  10/5/2011    Performed by DHIRAJ CASTANEDA at [facility] [place], [facility]        Allergies:  No Known Allergies    Social History:  Social History     Socioeconomic History    Marital status:      Spouse name: Not on file    Number of children: Not on file    Years of education: Not on file    Highest education level: Not on file   Occupational History    Not on file   Tobacco Use    Smoking status: Former     Types: Cigarettes    Smokeless tobacco: Never    Tobacco comments:     Once or twice yearly   Vaping Use    Vaping status: Never Used   Substance and Sexual Activity    Alcohol use: Yes     Alcohol/week: 1.0 standard drink of alcohol     Types: 1 Standard drinks or equivalent per week      Comment: rare    Drug use: No    Sexual activity: Not on file   Other Topics Concern    Not on file   Social History Narrative    Not on file     Social Determinants of Health     Financial Resource Strain: Not on file   Food Insecurity: No Food Insecurity (5/10/2024)    Food Insecurity     Food Insecurity: Never true   Transportation Needs: No Transportation Needs (5/10/2024)    Transportation Needs     Lack of Transportation: No     Car Seat: Not on file   Physical Activity: Not on file   Stress: Not on file   Social Connections: Not on file   Housing Stability: Low Risk  (5/10/2024)    Housing Stability     Housing Instability: No     Housing Instability Emergency: No     Crib or [place]: Not on file          Family History:  Family History   Problem Relation Age of Onset    Heart Disorder Father     Heart Disorder Mother         stroke    Hypertension Brother     Diabetes Brother     Cancer Maternal Grandmother         lung        Home Medications:  Outpatient Medications Marked as Taking for the 5/10/24 encounter (Hospital Encounter)   Medication Sig Dispense Refill    Coenzyme Q10 (CO Q 10 OR) Take by mouth.      CITALOPRAM 20 MG Oral Tab TAKE 1 TABLET DAILY (Patient taking differently: Take 0.5 tablets (10 mg total) by mouth daily.) 90 tablet 0    tamsulosin (FLOMAX) cap Take 1 capsule (0.4 mg total) by mouth daily. Due for yearly follow up. Please schedule to continue receiving refills. 90 capsule 0    lisinopril 40 MG Oral Tab Take 1 tablet (40 mg total) by mouth daily.      Multiple Vitamin (MULTI-VITAMIN DAILY) Oral Tab Take by mouth.      aspirin 81 MG Oral Chew Tab Chew 1 tablet (81 mg total) by mouth daily. 100 tablet 3    rosuvastatin 5 MG Oral Tab Take 1 tablet (5 mg total) by mouth nightly. 90 tablet 3    chlorthalidone 25 MG Oral Tab Take 0.5 tablets (12.5 mg total) by mouth daily. 45 tablet 3    Vitamin D3 (VITAMIN D3) 1000 UNITS Oral Tab Take 1 tablet (1,000 Units total) by mouth daily.          Scheduled Medication:   aspirin  81 mg Oral Daily    lisinopril  40 mg Oral Daily    rosuvastatin  5 mg Oral Nightly    heparin  5,000 Units Subcutaneous Q8H PHILLY    acetaminophen  1,000 mg Intravenous Q6H    ketorolac  15 mg Intravenous Q6H    chlorthalidone  12.5 mg Oral Daily    escitalopram  5 mg Oral Daily    tamsulosin  0.4 mg Oral Daily    cholecalciferol  1,000 Units Oral Daily     Continuous Infusing Medication:   sodium chloride 60 mL/hr at 05/10/24 1514     PRN Medication:  oxyCODONE **OR** oxyCODONE    HYDROmorphone **OR** HYDROmorphone    polyethylene glycol (PEG 3350)    sennosides    bisacodyl    fleet enema    ondansetron    prochlorperazine    calcium carbonate    ipratropium-albuterol     REVIEW OF SYSTEMS:   Reviewed and negative except per HPI    OBJECTIVE:  Vitals:    05/11/24 0000 05/11/24 0100 05/11/24 0200 05/11/24 0300   BP: 121/85 110/78 109/81 111/81   BP Location: Right arm      Pulse: 93 94 92 92   Resp: 13 14 13 14   Temp: 98.3 °F (36.8 °C)      TempSrc: Temporal      SpO2: 100% 100% 100% 100%   Weight:       Height:         O2: ra               Wt Readings from Last 3 Encounters:   05/10/24 193 lb (87.5 kg)   04/24/24 198 lb (89.8 kg)   04/10/24 194 lb (88 kg)         Intake/Output Summary (Last 24 hours) at 5/11/2024 0701  Last data filed at 5/11/2024 0400  Gross per 24 hour   Intake 1521 ml   Output 1500 ml   Net 21 ml       /81   Pulse 92   Temp 98.3 °F (36.8 °C) (Temporal)   Resp 14   Ht 5' 10\" (1.778 m)   Wt 193 lb (87.5 kg)   SpO2 100%   BMI 27.69 kg/m²     General Appearance:    Alert, cooperative, no distress, appears stated age   Head:    Normocephalic, without obvious abnormality, atraumatic   Eyes:    PERRL, conjunctiva/corneas clear   Neck:   Supple, symmetrical, trachea midline, no adenopathy;        thyroid:  No enlargement/tenderness/nodules; no carotid    bruit or JVD   Back:     Symmetric, no curvature, ROM normal, no CVA tenderness   Lungs:     Clear  to auscultation bilaterally, respirations unlabored   Chest wall:    Ct on L, no air leak    Heart:    Regular rate and rhythm, S1 and S2 normal, no murmur, rub   or gallop   Abdomen:     Soft, non-tender, bowel sounds active all four quadrants,     no masses, no organomegaly   Extremities:   Extremities normal, atraumatic, no cyanosis or edema   Pulses:   2+ and symmetric all extremities   Skin:   Skin color, texture, turgor normal, no rashes or lesions       No results for input(s): \"WBC\", \"HGB\", \"HCT\", \"PLT\" in the last 168 hours.  No results for input(s): \"INR\" in the last 168 hours.    No results for input(s): \"NA\", \"K\", \"CL\", \"CO2\", \"BUN\", \"CREATSERUM\", \"GFRAA\", \"GFRNAA\", \"GLU\", \"CA\", \"AST\", \"ALT\", \"ALKPHO\", \"BILT\", \"ALB\", \"TP\" in the last 168 hours.      Creatinine, Serum (mg/dL)   Date Value   02/27/2016 1.13   04/22/2014 1.17   03/16/2013 1.03     Creatinine (mg/dL)   Date Value   04/27/2024 1.04   08/14/2021 1.04   04/06/2021 1.00   11/07/2020 1.14   ]        ASSESSMENT/PLAN:    KYRIE carcinoid: s/p KYRIE lobectomy  - postop management per surgery  - ct per surgery   Joey: on cpap   - cont cpap;   Dispo: will follow again on Monday if he remains in house. If discharged prior should f/u with Dr. Price in 2-3 weeks          Tenzin Vale M.D.  [facility]  Pulmonary / Critical care  5/11/2024

## 2024-05-12 RX ORDER — ACETAMINOPHEN 325 MG/1
650 TABLET ORAL EVERY 6 HOURS PRN
Status: DISCONTINUED | OUTPATIENT
Start: 2024-05-12 | End: 2024-05-15

## 2024-05-12 NOTE — PLAN OF CARE
Assumed care of pt this afternoon aprox 15:30, pt resting in bed with left chest tube to water seal, that has small expiratory air leak. Pt able to ambulate to bathroom with increase in pain to surgical site. Pain medication given as ordered. POC explained, all questions answered.

## 2024-05-12 NOTE — PLAN OF CARE
Pt alert and oriented x4. Pt on tele in NSR. Denies any c/o of chest pain or shortness of breath. Pt on room air. L chest tube site without complication, dressing in place, C/D/I. Pt continent of bowel and bladder. Pt endorsed pain relieved by scheduled pain medication. Updated pt on plan of care, pt verbalizes understanding. Bed in lowest position and call light within reach.      Skin checked completed by this RN and NOC RNMathieu.     Problem: Patient/Family Goals  Goal: Patient/Family Long Term Goal  Description: Patient's Long Term Goal: to go home    Interventions:  - ambulate in hallway  - See additional Care Plan goals for specific interventions  Outcome: Progressing  Goal: Patient/Family Short Term Goal  Description: Patient's Short Term Goal: manage pain    Interventions:   - PRN pain medication available  - See additional Care Plan goals for specific interventions  Outcome: Progressing     Problem: CARDIOVASCULAR - ADULT  Goal: Maintains optimal cardiac output and hemodynamic stability  Description: INTERVENTIONS:  - Monitor vital signs, rhythm, and trends  - Monitor for bleeding, hypotension and signs of decreased cardiac output  - Evaluate effectiveness of vasoactive medications to optimize hemodynamic stability  - Monitor arterial and/or venous puncture sites for bleeding and/or hematoma  - Assess quality of pulses, skin color and temperature  - Assess for signs of decreased coronary artery perfusion - ex. Angina  - Evaluate fluid balance, assess for edema, trend weights  Outcome: Progressing     Problem: SAFETY ADULT - FALL  Goal: Free from fall injury  Description: INTERVENTIONS:  - Assess pt frequently for physical needs  - Identify cognitive and physical deficits and behaviors that affect risk of falls.  - [place] fall precautions as indicated by assessment.  - Educate pt/family on patient safety including physical limitations  - Instruct pt to call for assistance with activity based on  assessment  - Modify environment to reduce risk of injury  - Provide assistive devices as appropriate  - Consider OT/PT consult to assist with strengthening/mobility  - Encourage toileting schedule  Outcome: Progressing     Problem: PAIN - ADULT  Goal: Verbalizes/displays adequate comfort level or patient's stated pain goal  Description: INTERVENTIONS:  - Encourage pt to monitor pain and request assistance  - Assess pain using appropriate pain scale  - Administer analgesics based on type and severity of pain and evaluate response  - Implement non-pharmacological measures as appropriate and evaluate response  - Consider cultural and social influences on pain and pain management  - Manage/alleviate anxiety  - Utilize distraction and/or relaxation techniques  - Monitor for opioid side effects  - Notify MD/LIP if interventions unsuccessful or patient reports new pain  - Anticipate increased pain with activity and pre-medicate as appropriate  Outcome: Progressing

## 2024-05-12 NOTE — PLAN OF CARE
Assumed care of pt at approximately 1930. NSR on tele, RA. Using IS. CPAP settings adjusted by RT and used overnight. Chest tube remains intact, expiratory air leak remains, see outputs. Patient ate all of dinner without feeling nauseous. Ambulating in halls with steady gait and no assistive device. Transfer orders in, new room assigned; 8605. Report given to Mathieu PATTERSON. Patient updated on room change and reports he will update his family.    Problem: Patient/Family Goals  Goal: Patient/Family Long Term Goal  Description: Patient's Long Term Goal: to go home    Interventions:  - ambulate in hallway  - See additional Care Plan goals for specific interventions  Outcome: Progressing  Goal: Patient/Family Short Term Goal  Description: Patient's Short Term Goal: manage pain    Interventions:   - PRN pain medication available  - See additional Care Plan goals for specific interventions  Outcome: Progressing     Problem: CARDIOVASCULAR - ADULT  Goal: Maintains optimal cardiac output and hemodynamic stability  Description: INTERVENTIONS:  - Monitor vital signs, rhythm, and trends  - Monitor for bleeding, hypotension and signs of decreased cardiac output  - Evaluate effectiveness of vasoactive medications to optimize hemodynamic stability  - Monitor arterial and/or venous puncture sites for bleeding and/or hematoma  - Assess quality of pulses, skin color and temperature  - Assess for signs of decreased coronary artery perfusion - ex. Angina  - Evaluate fluid balance, assess for edema, trend weights  Outcome: Progressing     Problem: SAFETY ADULT - FALL  Goal: Free from fall injury  Description: INTERVENTIONS:  - Assess pt frequently for physical needs  - Identify cognitive and physical deficits and behaviors that affect risk of falls.  - [place] fall precautions as indicated by assessment.  - Educate pt/family on patient safety including physical limitations  - Instruct pt to call for assistance with activity based on  assessment  - Modify environment to reduce risk of injury  - Provide assistive devices as appropriate  - Consider OT/PT consult to assist with strengthening/mobility  - Encourage toileting schedule  Outcome: Progressing     Problem: PAIN - ADULT  Goal: Verbalizes/displays adequate comfort level or patient's stated pain goal  Description: INTERVENTIONS:  - Encourage pt to monitor pain and request assistance  - Assess pain using appropriate pain scale  - Administer analgesics based on type and severity of pain and evaluate response  - Implement non-pharmacological measures as appropriate and evaluate response  - Consider cultural and social influences on pain and pain management  - Manage/alleviate anxiety  - Utilize distraction and/or relaxation techniques  - Monitor for opioid side effects  - Notify MD/LIP if interventions unsuccessful or patient reports new pain  - Anticipate increased pain with activity and pre-medicate as appropriate  Outcome: Progressing

## 2024-05-12 NOTE — PROGRESS NOTES
Thoracic Surgery Progress Note     Lior Brown is a 58 year old male. MRN [id number]. Admitted 5/10/2024    SUBJECTIVE/24H EVENTS:     No acute events overnight. Doing well. Ambulating and using IS. No new concerns.       Objective:     VITALS:     Temp (24hrs), Av.4 °F (36.9 °C), Min:98.2 °F (36.8 °C), Max:98.7 °F (37.1 °C)   /86 (BP Location: Left arm)   Pulse 66   Temp 98.3 °F (36.8 °C) (Oral)   Resp 21   Ht 177.8 cm (5' 10\")   Wt 193 lb (87.5 kg)   SpO2 99%   BMI 27.69 kg/m²     EXAM:   General: well appearing male in no acute distress  Heart: regular rate and rhythm.   Lungs: Normal respiratory effort. Equal chest rise bilaterally  Incisions: c/d/i   Chest tube:   Air Leak: FE1  Output: serosanguineous   24 hour: 300 cc  Suction: no  Abdomen: Soft, Non-tender, non-distended.  Extremities: No clubbing or cyanosis. No lateralizing weakness  Neuro: no focal defects  Psych:  oriented to person place and time, normal mood and affect      Intake/Output Summary (Last 24 hours) at 2024 0917  Last data filed at 2024 0705  Gross per 24 hour   Intake 250 ml   Output 750 ml   Net -500 ml         MEDS:   aspirin  81 mg Oral Daily    lisinopril  40 mg Oral Daily    rosuvastatin  5 mg Oral Nightly    heparin  5,000 Units Subcutaneous Q8H [facility]    acetaminophen  1,000 mg Intravenous Q6H    chlorthalidone  12.5 mg Oral Daily    escitalopram  5 mg Oral Daily    tamsulosin  0.4 mg Oral Daily    cholecalciferol  1,000 Units Oral Daily         Assessment/Plan:     58 year old male 2 days post op from left VATS upper lobectomy.     -Chest tube to water seal. + Air leak.   -General diet.   -Pain control.   -Maintain saturations above 90%.   -Ambulate 3-4 times per day in the halls. Spend majority of day out of bed, in chair. Encouraged cough and IS use 10x hourly. Instructed how to use IS.       Yamileth Amaya PA-C  Thoracic Surgery  Pager: [phone]

## 2024-05-12 NOTE — PROGRESS NOTES
[facility] Hospitalist Progress Note                                                                     [facility]   [facility]  6/25/1965    SUBJECTIVE: no chest pain, palpitations, shortness of breath, cough, nausea, vomiting, abdominal pain. Post operative pain controlled.     OBJECTIVE:  Temp:  [97.5 °F (36.4 °C)-98.7 °F (37.1 °C)] 97.5 °F (36.4 °C)  Pulse:  [65-88] 68  Resp:  [13-22] 20  BP: (106-147)/(70-95) 122/78  SpO2:  [97 %-100 %] 99 %  Exam  Gen: No acute distress, alert and oriented  Pulm: Lungs clear bilaterally, normal respiratory effort, no crackles, no wheezing, chest tube in place  CV: Heart with regular rate and rhythm, no murmur.    Abd: Abdomen soft, nontender, nondistended, bowel sounds present  MSK: No significant pitting edema or tenderness of the LE  Skin: no new rashes or lesions    Labs:   No results for input(s): \"WBC\", \"HGB\", \"MCV\", \"PLT\", \"BAND\", \"INR\" in the last 168 hours.    Invalid input(s): \"LYM#\", \"MONO#\", \"BASOS#\", \"EOSIN#\"    No results for input(s): \"NA\", \"K\", \"CL\", \"CO2\", \"BUN\", \"CREATSERUM\", \"CA\", \"CAION\", \"MG\", \"PHOS\", \"GLU\" in the last 168 hours.    No results for input(s): \"ALT\", \"AST\", \"ALB\", \"AMYLASE\", \"LIPASE\", \"LDH\" in the last 168 hours.    Invalid input(s): \"ALPHOS\", \"TBIL\", \"DBIL\", \"TPROT\"    No results for input(s): \"PGLU\" in the last 168 hours.    Meds:   Scheduled:    aspirin  81 mg Oral Daily    lisinopril  40 mg Oral Daily    rosuvastatin  5 mg Oral Nightly    heparin  5,000 Units Subcutaneous Q8H [facility]    acetaminophen  1,000 mg Intravenous Q6H    chlorthalidone  12.5 mg Oral Daily    escitalopram  5 mg Oral Daily    tamsulosin  0.4 mg Oral Daily    cholecalciferol  1,000 Units Oral Daily     Continuous Infusions:       PRN:   oxyCODONE **OR** oxyCODONE    HYDROmorphone **OR** HYDROmorphone    polyethylene glycol (PEG 3350)    sennosides    bisacodyl    fleet enema     ondansetron    prochlorperazine    calcium carbonate    ipratropium-albuterol    ASSESSMENT / PLAN:     58 yr old male with PMH sig for HTN, HLD, and BPH who presents s/p L VATS for carcinoid tumor.     # Carcinoid tumor of lung   s/p L VATS  Post op care per CT surger  Encourage IS use     # Essential HTN  controlled  lisinopril and chlorthalidone     # HLD  Cont statin     # BPH  Cont flomax     # Generalized anxiety d/o  - cont citalopram      Dispo: per thoracic surgery     Ag Guillen MD  [facility] Hospitalist  Answering Service number: [phone]

## 2024-05-13 PROCEDURE — 97535 SELF CARE MNGMENT TRAINING: CPT

## 2024-05-13 PROCEDURE — 94799 UNLISTED PULMONARY SVC/PX: CPT

## 2024-05-13 PROCEDURE — 97165 OT EVAL LOW COMPLEX 30 MIN: CPT

## 2024-05-13 RX ORDER — BENZONATATE 200 MG/1
200 CAPSULE ORAL 3 TIMES DAILY PRN
Status: DISCONTINUED | OUTPATIENT
Start: 2024-05-13 | End: 2024-05-15

## 2024-05-13 NOTE — PROGRESS NOTES
S: He is doing ok today. He has some postop pain but otherwise feels ok.    Meds:   aspirin  81 mg Oral Daily    lisinopril  40 mg Oral Daily    rosuvastatin  5 mg Oral Nightly    heparin  5,000 Units Subcutaneous Q8H [facility]    chlorthalidone  12.5 mg Oral Daily    escitalopram  5 mg Oral Daily    tamsulosin  0.4 mg Oral Daily    cholecalciferol  1,000 Units Oral Daily       Prn Meds:    acetaminophen    oxyCODONE **OR** oxyCODONE    HYDROmorphone **OR** HYDROmorphone    polyethylene glycol (PEG 3350)    sennosides    bisacodyl    fleet enema    ondansetron    prochlorperazine    calcium carbonate    ipratropium-albuterol    Infusions:      OBJECTIVE:  Vitals:    05/12/24 1945 05/12/24 2330 05/13/24 0600 05/13/24 0805   BP: 139/89 129/84 (!) 138/91 145/90   Pulse: 93 81 90 73   Resp: 23 18 17 22   Temp: 97.9 °F (36.6 °C) 98.4 °F (36.9 °C) 97.8 °F (36.6 °C) 98.4 °F (36.9 °C)   TempSrc: Oral Axillary Axillary Oral   SpO2: 99% 97% 98% 97%   Weight:       Height:         O2: room air    Gen - Alert, oriented x 3, in no apparent distress  Lungs - slight crackles at bases. Left chest tube in place, + air leak when coughing  CV - regular rate & rhythm. Normal S1, S2. No murmurs, rubs, or gallops appreciated.  Abdomen - soft, nontender to palpation   Extremities - No cyanosis, clubbing, edema appreciated.      ASSESSMENT AND PLAN      KYRIE carcinoid: s/p KYRIE lobectomy 5/10/24  - postop management per surgery  - chest tube management per surgery   MARLENE: on cpap   - cont cpap with sleep   Dispo  -full code  -home when chest tube removed and when ok with thoracic surgery     We will continue to follow with you     Aquilino Loo M.D.  Pulmonary/Critical Care

## 2024-05-13 NOTE — PROGRESS NOTES
Thoracic Surgery Progress Note     Lior Brown is a 58 year old male. MRN [id number]. Admitted 5/10/2024    SUBJECTIVE/24H EVENTS:     No acute events overnight. Doing well. Ambulating and using IS. No new concerns.     Objective:     VITALS:     Temp (24hrs), Av.1 °F (36.7 °C), Min:97.5 °F (36.4 °C), Max:98.7 °F (37.1 °C)   /90 (BP Location: Left arm)   Pulse 73   Temp 98.4 °F (36.9 °C) (Oral)   Resp 22   Ht 177.8 cm (5' 10\")   Wt 193 lb (87.5 kg)   SpO2 97%   BMI 27.69 kg/m²     EXAM:   General: well appearing male in no acute distress  Heart: regular rate and rhythm.   Lungs: Normal respiratory effort. Equal chest rise bilaterally  Incisions: c/d/i   Chest tube:   Air Leak: FE1  Output: serosanguineous   24 hour: 130 cc  Suction: no  Abdomen: Soft, Non-tender, non-distended.  Extremities: No clubbing or cyanosis. No lateralizing weakness  Neuro: no focal defects  Psych:  oriented to person place and time, normal mood and affect      Intake/Output Summary (Last 24 hours) at 2024 0831  Last data filed at 2024 0700  Gross per 24 hour   Intake 720 ml   Output 190 ml   Net 530 ml         MEDS:   aspirin  81 mg Oral Daily    lisinopril  40 mg Oral Daily    rosuvastatin  5 mg Oral Nightly    heparin  5,000 Units Subcutaneous Q8H [facility]    chlorthalidone  12.5 mg Oral Daily    escitalopram  5 mg Oral Daily    tamsulosin  0.4 mg Oral Daily    cholecalciferol  1,000 Units Oral Daily         Assessment/Plan:     58 year old male 3 days post op from left VATS upper lobectomy.     -Chest tube to water seal. + Air leak.   -General diet.   -Pain control.   -Maintain saturations above 90%.   -Ambulate 3-4 times per day in the halls. Spend majority of day out of bed, in chair. Encouraged cough and IS use 10x hourly. Instructed how to use IS.       Yamileth Amaya PA-C  Thoracic Surgery  Pager: [phone]    Patient seen and examined. I agree with above assessment and plan.    Doing well. Wating  out an air leak.  Ambulate, IS    Sundeep Chaidez MD  Thoracic Surgery  Pager [phone]

## 2024-05-13 NOTE — PLAN OF CARE
Acquired patient around 0730. Alert and oriented x4. On Ra. SpO2 within normal limits. Pt denies SOB or cp. POD #3 VATS. Air leak present at this time. Encourage patient to verbalize pain. Encourage patient to ambulate. NSR on tele. Continent of bowel and bladder. Call light within reach. Continue plan of care.        Problem: Patient/Family Goals  Goal: Patient/Family Long Term Goal  Description: Patient's Long Term Goal: to go home    Interventions:  - ambulate in hallway  - See additional Care Plan goals for specific interventions  Outcome: Progressing  Goal: Patient/Family Short Term Goal  Description: Patient's Short Term Goal: manage pain    Interventions:   - PRN pain medication available  - See additional Care Plan goals for specific interventions  Outcome: Progressing     Problem: CARDIOVASCULAR - ADULT  Goal: Maintains optimal cardiac output and hemodynamic stability  Description: INTERVENTIONS:  - Monitor vital signs, rhythm, and trends  - Monitor for bleeding, hypotension and signs of decreased cardiac output  - Evaluate effectiveness of vasoactive medications to optimize hemodynamic stability  - Monitor arterial and/or venous puncture sites for bleeding and/or hematoma  - Assess quality of pulses, skin color and temperature  - Assess for signs of decreased coronary artery perfusion - ex. Angina  - Evaluate fluid balance, assess for edema, trend weights  Outcome: Progressing     Problem: SAFETY ADULT - FALL  Goal: Free from fall injury  Description: INTERVENTIONS:  - Assess pt frequently for physical needs  - Identify cognitive and physical deficits and behaviors that affect risk of falls.  - [place] fall precautions as indicated by assessment.  - Educate pt/family on patient safety including physical limitations  - Instruct pt to call for assistance with activity based on assessment  - Modify environment to reduce risk of injury  - Provide assistive devices as appropriate  - Consider OT/PT consult to  assist with strengthening/mobility  - Encourage toileting schedule  Outcome: Progressing     Problem: PAIN - ADULT  Goal: Verbalizes/displays adequate comfort level or patient's stated pain goal  Description: INTERVENTIONS:  - Encourage pt to monitor pain and request assistance  - Assess pain using appropriate pain scale  - Administer analgesics based on type and severity of pain and evaluate response  - Implement non-pharmacological measures as appropriate and evaluate response  - Consider cultural and social influences on pain and pain management  - Manage/alleviate anxiety  - Utilize distraction and/or relaxation techniques  - Monitor for opioid side effects  - Notify MD/LIP if interventions unsuccessful or patient reports new pain  - Anticipate increased pain with activity and pre-medicate as appropriate  Outcome: Progressing

## 2024-05-13 NOTE — OCCUPATIONAL THERAPY NOTE
OCCUPATIONAL THERAPY EVALUATION - INPATIENT    Room Number: 8605/8605-A  Evaluation Date: 5/13/2024     Type of Evaluation: Initial  Presenting Problem: 5/10 bronch with lobectomy with lymph node resection    Physician Order: IP Consult to Occupational Therapy  Reason for Therapy:  ADL/IADL Dysfunction and Discharge Planning      OCCUPATIONAL THERAPY ASSESSMENT   Patient is a 58 year old male admitted on 5/10/2024 with Presenting Problem: 5/10 bronch with lobectomy with lymph node resection. Co-Morbidities : Gout, GERD, HTN, Anxiety  Patient is currently functioning at baseline with toileting, upper body dressing, lower body dressing, grooming, bed mobility, transfers, stating sitting balance, dynamic sitting balance, static standing balance, dynamic standing balance, maintaining seated position, and functional standing tolerance.  Prior to admission, patient's baseline is Mod I.  Patient met all OT goals at Mod I level.  Patient reports no further questions/concerns at this time.           WEIGHT BEARING RESTRICTION  Weight Bearing Restriction: None                Recommendations for nursing staff:   Transfers: Mod I  Toileting location: Toilet    EVALUATION SESSION:  Patient at start of session: supine in bed for session  FUNCTIONAL TRANSFER ASSESSMENT  Sit to Stand: Edge of Bed  Edge of Bed: Modified Independent  Toilet Transfer: Modified Independent    BED MOBILITY  Rolling: Modified Independent  Supine to Sit : Modified Independent  Scooting: Mod I to EOB    BALANCE ASSESSMENT  Static Sitting: Modified Independent  Sitting Bilateral: Modified Independent  Static Standing: Modified Independent  Standing Bilateral: Modified Independent    FUNCTIONAL ADL ASSESSMENT  Grooming Standing: Modified Independent (to wash up at sink)  LB Dressing Seated: Modified Independent (to adam socks)  LB Dressing Standing: Modified Independent (to pull pants up and down for toileting)  Toileting Seated: Modified Independent (at  std toilet)      ACTIVITY TOLERANCE: vitals stable                         O2 SATURATIONS       COGNITION  Overall Cognitive Status:  WFL - within functional limits  COGNITION ASSESSMENTS       Upper Extremity:   ROM: within functional limits   Strength: is within functional limits   Coordination:  Gross motor: WNL  Fine motor: WNL  Sensation: Light touch:  intact    EDUCATION PROVIDED  Patient: Role of Occupational Therapy; Plan of Care  Patient's Response to Education: Verbalized Understanding; Returned Demonstration    Equipment used: RW  Demonstrates functional use    Therapist comments: Pt reported fatigue at home, pt educated on work simplification and energy conservation for at home to assist with independence with fatigue at home.    Patient End of Session: Up in chair;Needs met;Call light within reach;All patient questions and concerns addressed    OCCUPATIONAL PROFILE    HOME SITUATION  Type of Home: House  Home Layout: Two level  Lives With: Spouse    Toilet and Equipment: Standard height toilet  Shower/Tub and Equipment: Walk-in shower  Other Equipment: None          Drives: Yes  Patient Regularly Uses: Glasses    Prior Level of Function: Pt typically independent with ADLs and mobility. Pt does not use AD.    SUBJECTIVE  Pt stated, \"I am doing well.\"    PAIN ASSESSMENT  Ratin  Location: no pain at this time       OBJECTIVE  Precautions: Chest tube  Fall Risk: Standard fall risk    WEIGHT BEARING RESTRICTION  Weight Bearing Restriction: None                AM-PAC ‘6-Clicks’ Inpatient Daily Activity Short Form  -   Putting on and taking off regular lower body clothing?: None  -   Bathing (including washing, rinsing, drying)?: None  -   Toileting, which includes using toilet, bedpan or urinal? : None  -   Putting on and taking off regular upper body clothing?: None  -   Taking care of personal grooming such as brushing teeth?: None  -   Eating meals?: None    AM-PAC Score:  Score: 24  Approx Degree of  Impairment: 0%  Standardized Score (AM-PAC Scale): 57.54      ADDITIONAL TESTS     NEUROLOGICAL FINDINGS        PLAN   Patient has been evaluated and presents with no skilled Occupational Therapy needs at this time.  Patient discharged from Occupational Therapy services.  Please re-order if a new functional limitation presents during this admission.      Patient Evaluation Complexity Level:   Occupational Profile/Medical History LOW - Brief history including review of medical or therapy records    Specific performance deficits impacting engagement in ADL/IADL LOW  1 - 3 performance deficits    Client Assessment/Performance Deficits LOW - No comorbidities nor modifications of tasks    Clinical Decision Making LOW - Analysis of occupational profile, problem-focused assessments, limited treatment options    Overall Complexity LOW     OT Session Time: 20 minutes  Self-Care Home Management: 10 minutes  Therapeutic Activity: 0 minutes  Neuromuscular Re-education: 0 minutes  Therapeutic Exercise: 0 minutes  Cognitive Skills: 0 minutes  Sensory Integrative: 0 minutes  Orthotic Management and Trainin minutes  Can add/delete any of these

## 2024-05-13 NOTE — PROGRESS NOTES
[facility] Hospitalist Progress Note                                                                     [facility]  6/25/1965    SUBJECTIVE: no chest pain, palpitations, shortness of breath, cough, nausea, vomiting, abdominal pain. Post operative pain controlled.     OBJECTIVE:  Temp:  [97.5 °F (36.4 °C)-98.7 °F (37.1 °C)] 97.8 °F (36.6 °C)  Pulse:  [65-93] 90  Resp:  [17-23] 17  BP: (122-147)/(78-95) 138/91  SpO2:  [97 %-99 %] 98 %  Exam  Gen: No acute distress, alert and oriented  Pulm: Lungs clear bilaterally, normal respiratory effort, no crackles, no wheezing, chest tube in place  CV: Heart with regular rate and rhythm, no murmur.    Abd: Abdomen soft, nontender, nondistended, bowel sounds present  MSK: No significant pitting edema or tenderness of the LE  Skin: no new rashes or lesions    Labs:   No results for input(s): \"WBC\", \"HGB\", \"MCV\", \"PLT\", \"BAND\", \"INR\" in the last 168 hours.    Invalid input(s): \"LYM#\", \"MONO#\", \"BASOS#\", \"EOSIN#\"    No results for input(s): \"NA\", \"K\", \"CL\", \"CO2\", \"BUN\", \"CREATSERUM\", \"CA\", \"CAION\", \"MG\", \"PHOS\", \"GLU\" in the last 168 hours.    No results for input(s): \"ALT\", \"AST\", \"ALB\", \"AMYLASE\", \"LIPASE\", \"LDH\" in the last 168 hours.    Invalid input(s): \"ALPHOS\", \"TBIL\", \"DBIL\", \"TPROT\"    No results for input(s): \"PGLU\" in the last 168 hours.    Meds:   Scheduled:    aspirin  81 mg Oral Daily    lisinopril  40 mg Oral Daily    rosuvastatin  5 mg Oral Nightly    heparin  5,000 Units Subcutaneous Q8H [facility]    chlorthalidone  12.5 mg Oral Daily    escitalopram  5 mg Oral Daily    tamsulosin  0.4 mg Oral Daily    cholecalciferol  1,000 Units Oral Daily     Continuous Infusions:       PRN:   acetaminophen    oxyCODONE **OR** oxyCODONE    HYDROmorphone **OR** HYDROmorphone    polyethylene glycol (PEG 3350)    sennosides    bisacodyl    fleet enema    ondansetron    prochlorperazine     calcium carbonate    ipratropium-albuterol    ASSESSMENT / PLAN:     58 yr old male with PMH sig for HTN, HLD, and BPH who presents s/p L VATS for carcinoid tumor.     # Carcinoid tumor of lung  s/p L VATS  Post op care per CT surgery, CT removal pending Thoracic surgery eval today  Encourage IS use     # Essential HTN  controlled  lisinopril and chlorthalidone     # HLD  Cont statin     # BPH  Cont flomax     # Generalized anxiety d/o  - cont citalopram      Dispo: per thoracic surgery     Ag Guillen MD  [facility]  Answering Service number: [phone]

## 2024-05-14 LAB
BLOOD TYPE BARCODE: 9500
HCT VFR BLD AUTO: 39.3 %
HGB BLD-MCNC: 13.4 G/DL
UNIT VOLUME: 350 ML

## 2024-05-14 PROCEDURE — 85014 HEMATOCRIT: CPT | Performed by: STUDENT IN AN ORGANIZED HEALTH CARE EDUCATION/TRAINING PROGRAM

## 2024-05-14 PROCEDURE — 85018 HEMOGLOBIN: CPT | Performed by: STUDENT IN AN ORGANIZED HEALTH CARE EDUCATION/TRAINING PROGRAM

## 2024-05-14 PROCEDURE — 94799 UNLISTED PULMONARY SVC/PX: CPT

## 2024-05-14 NOTE — PROGRESS NOTES
Thoracic Surgery Progress Note     Lior Brown is a 58 year old male. MRN [id number]. Admitted 5/10/2024    SUBJECTIVE/24H EVENTS:     No acute events overnight. Doing well. Ambulating and using IS. No new concerns.     Objective:     VITALS:     Temp (24hrs), Av.7 °F (37.1 °C), Min:98.1 °F (36.7 °C), Max:99.1 °F (37.3 °C)   BP (!) 130/92 (BP Location: Left arm)   Pulse 77   Temp 98.2 °F (36.8 °C) (Oral)   Resp 23   Ht 177.8 cm (5' 10\")   Wt 193 lb (87.5 kg)   SpO2 98%   BMI 27.69 kg/m²     EXAM:   General: well appearing male in no acute distress  Heart: regular rate and rhythm.   Lungs: Normal respiratory effort. Equal chest rise bilaterally  Incisions: c/d/i   Chest tube:   Air Leak: intermittent FE1  Output: serosanguineous   24 hour: 160 cc  Suction: no  Abdomen: Soft, Non-tender, non-distended.  Extremities: No clubbing or cyanosis. No lateralizing weakness  Neuro: no focal defects  Psych:  oriented to person place and time, normal mood and affect      Intake/Output Summary (Last 24 hours) at 2024 0914  Last data filed at 2024 0833  Gross per 24 hour   Intake 240 ml   Output 100 ml   Net 140 ml         MEDS:   aspirin  81 mg Oral Daily    lisinopril  40 mg Oral Daily    rosuvastatin  5 mg Oral Nightly    heparin  5,000 Units Subcutaneous Q8H [facility]    chlorthalidone  12.5 mg Oral Daily    escitalopram  5 mg Oral Daily    tamsulosin  0.4 mg Oral Daily    cholecalciferol  1,000 Units Oral Daily         Assessment/Plan:     58 year old male 4 days post op from left VATS upper lobectomy.     -Chest tube to water seal. Has small air leak, improving.   -General diet.   -Pain control.   -Maintain saturations above 90%.   -Ambulate 3-4 times per day in the halls. Spend majority of day out of bed, in chair. Encouraged cough and IS use 10x hourly. Instructed how to use IS.       Yamileth Amaya PA-C  Thoracic Surgery  Pager: [phone]

## 2024-05-14 NOTE — PROGRESS NOTES
S: He is feeling ok, pain is reasonably controlled but it hurts when he coughs.    Meds:   aspirin  81 mg Oral Daily    lisinopril  40 mg Oral Daily    rosuvastatin  5 mg Oral Nightly    heparin  5,000 Units Subcutaneous Q8H PHILLY    chlorthalidone  12.5 mg Oral Daily    escitalopram  5 mg Oral Daily    tamsulosin  0.4 mg Oral Daily    cholecalciferol  1,000 Units Oral Daily       Prn Meds:    benzonatate    acetaminophen    oxyCODONE **OR** oxyCODONE    HYDROmorphone **OR** HYDROmorphone    polyethylene glycol (PEG 3350)    sennosides    bisacodyl    fleet enema    ondansetron    prochlorperazine    calcium carbonate    ipratropium-albuterol    Infusions:      OBJECTIVE:  Vitals:    05/13/24 2111 05/13/24 2300 05/14/24 0400 05/14/24 0833   BP:  138/83 (!) 144/91 (!) 130/92   Pulse: 102 89 74 77   Resp: 24 22 17 23   Temp:  98.7 °F (37.1 °C) 98.1 °F (36.7 °C) 98.2 °F (36.8 °C)   TempSrc:  Oral Oral Oral   SpO2:    98%   Weight:       Height:         O2: room air    Gen - Alert, oriented x 3, in no apparent distress  Lungs - slight crackles at bases. Left chest tube in place, + air leak when coughing  CV - regular rate & rhythm. Normal S1, S2. No murmurs, rubs, or gallops appreciated.  Abdomen - soft, nontender to palpation   Extremities - No cyanosis, clubbing, edema appreciated.      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 Still has air leak from chest tube.  - postop management per surgery  - chest tube management per surgery   MARLENE: on cpap   - cont cpap with sleep   Dispo  -full code  -home when chest tube removed and when ok with thoracic surgery     We will continue to follow with you     Aquilino Loo M.D.  Pulmonary/Critical Care

## 2024-05-14 NOTE — PROGRESS NOTES
Assumed care of pt at 1930 on 5/12  A/Ox4, up w/ standby.   Room air w/ cpap. Adequate o2 sats.   NSR on tele. Denies chest pain.   L chest tube water seal. No subcutaneous emphysema.   Fall precautions in place. Call light in reach. Pt updated on plan of care.

## 2024-05-14 NOTE — PLAN OF CARE
Assumed care of pt around 0730  AxO x4, R/A, up independently  NSR on tele, no cardiac symptoms  Pt denies any pain  Left sided chest tube to waterseal, expiratory air leak present  Continent of bowel and bladder  Fall precautions in place  Pt updated on plan of care, all needs met at this time              Problem: Patient/Family Goals  Goal: Patient/Family Long Term Goal  Description: Patient's Long Term Goal: to go home    Interventions:  - ambulate in hallway  - See additional Care Plan goals for specific interventions  Outcome: Progressing  Goal: Patient/Family Short Term Goal  Description: Patient's Short Term Goal: manage pain    Interventions:   - PRN pain medication available  - See additional Care Plan goals for specific interventions  Outcome: Progressing     Problem: CARDIOVASCULAR - ADULT  Goal: Maintains optimal cardiac output and hemodynamic stability  Description: INTERVENTIONS:  - Monitor vital signs, rhythm, and trends  - Monitor for bleeding, hypotension and signs of decreased cardiac output  - Evaluate effectiveness of vasoactive medications to optimize hemodynamic stability  - Monitor arterial and/or venous puncture sites for bleeding and/or hematoma  - Assess quality of pulses, skin color and temperature  - Assess for signs of decreased coronary artery perfusion - ex. Angina  - Evaluate fluid balance, assess for edema, trend weights  Outcome: Progressing     Problem: SAFETY ADULT - FALL  Goal: Free from fall injury  Description: INTERVENTIONS:  - Assess pt frequently for physical needs  - Identify cognitive and physical deficits and behaviors that affect risk of falls.  - [place] fall precautions as indicated by assessment.  - Educate pt/family on patient safety including physical limitations  - Instruct pt to call for assistance with activity based on assessment  - Modify environment to reduce risk of injury  - Provide assistive devices as appropriate  - Consider OT/PT consult to assist with  strengthening/mobility  - Encourage toileting schedule  Outcome: Progressing     Problem: PAIN - ADULT  Goal: Verbalizes/displays adequate comfort level or patient's stated pain goal  Description: INTERVENTIONS:  - Encourage pt to monitor pain and request assistance  - Assess pain using appropriate pain scale  - Administer analgesics based on type and severity of pain and evaluate response  - Implement non-pharmacological measures as appropriate and evaluate response  - Consider cultural and social influences on pain and pain management  - Manage/alleviate anxiety  - Utilize distraction and/or relaxation techniques  - Monitor for opioid side effects  - Notify MD/LIP if interventions unsuccessful or patient reports new pain  - Anticipate increased pain with activity and pre-medicate as appropriate  Outcome: Progressing

## 2024-05-14 NOTE — PLAN OF CARE
Assumed care of pt at 1930  A/Ox4, up independently.   Tolerating ambulation w/ chest tube  Room air w/ cpap at night. Denies shortness of breath.   NSR on tele. No cardiac symptoms.   L chest tube to water seal. Intact. No subcutaneous emphysema.   PRN tylenol given.   Safety measures reviewed. Call light in reach. Pt updated on plan of care.     Problem: Patient/Family Goals  Goal: Patient/Family Long Term Goal  Description: Patient's Long Term Goal: to go home    Interventions:  - ambulate in hallway  - See additional Care Plan goals for specific interventions  Outcome: Progressing  Goal: Patient/Family Short Term Goal  Description: Patient's Short Term Goal: manage pain    Interventions:   - PRN pain medication available  - See additional Care Plan goals for specific interventions  Outcome: Progressing     Problem: CARDIOVASCULAR - ADULT  Goal: Maintains optimal cardiac output and hemodynamic stability  Description: INTERVENTIONS:  - Monitor vital signs, rhythm, and trends  - Monitor for bleeding, hypotension and signs of decreased cardiac output  - Evaluate effectiveness of vasoactive medications to optimize hemodynamic stability  - Monitor arterial and/or venous puncture sites for bleeding and/or hematoma  - Assess quality of pulses, skin color and temperature  - Assess for signs of decreased coronary artery perfusion - ex. Angina  - Evaluate fluid balance, assess for edema, trend weights  Outcome: Progressing     Problem: SAFETY ADULT - FALL  Goal: Free from fall injury  Description: INTERVENTIONS:  - Assess pt frequently for physical needs  - Identify cognitive and physical deficits and behaviors that affect risk of falls.  - [place] fall precautions as indicated by assessment.  - Educate pt/family on patient safety including physical limitations  - Instruct pt to call for assistance with activity based on assessment  - Modify environment to reduce risk of injury  - Provide assistive devices as  appropriate  - Consider OT/PT consult to assist with strengthening/mobility  - Encourage toileting schedule  Outcome: Progressing     Problem: PAIN - ADULT  Goal: Verbalizes/displays adequate comfort level or patient's stated pain goal  Description: INTERVENTIONS:  - Encourage pt to monitor pain and request assistance  - Assess pain using appropriate pain scale  - Administer analgesics based on type and severity of pain and evaluate response  - Implement non-pharmacological measures as appropriate and evaluate response  - Consider cultural and social influences on pain and pain management  - Manage/alleviate anxiety  - Utilize distraction and/or relaxation techniques  - Monitor for opioid side effects  - Notify MD/LIP if interventions unsuccessful or patient reports new pain  - Anticipate increased pain with activity and pre-medicate as appropriate  Outcome: Progressing

## 2024-05-14 NOTE — PROGRESS NOTES
[facility] Hospitalist Progress Note                                                                     [facility]   part of [facility]      Ramanuj Stephanie  6/25/1965    SUBJECTIVE: Seen and examined at bedside.  Sitting in a chair.  Working on his computer.  No new issues.      OBJECTIVE:  Temp:  [98.1 °F (36.7 °C)-99 °F (37.2 °C)] 98.6 °F (37 °C)  Pulse:  [] 94  Resp:  [10-25] 10  BP: (110-144)/(76-92) 110/76  SpO2:  [95 %-100 %] 100 %  Exam  Gen: No acute distress, alert and oriented, in good spirit  Pulm: Crackles on the left lower lobe, normal respiratory effort,  no wheezing, chest tube in place with airleak, serosanguineous fluid noted  CV: Heart with regular rate and rhythm, no murmur.    Abd: Abdomen soft, nontender, nondistended, bowel sounds present  MSK: No significant pitting edema or tenderness of the LE  Skin: no new rashes or lesions    Labs:   Recent Labs   Lab 05/14/24  1228   HGB 13.4       No results for input(s): \"NA\", \"K\", \"CL\", \"CO2\", \"BUN\", \"CREATSERUM\", \"CA\", \"CAION\", \"MG\", \"PHOS\", \"GLU\" in the last 168 hours.    No results for input(s): \"ALT\", \"AST\", \"ALB\", \"AMYLASE\", \"LIPASE\", \"LDH\" in the last 168 hours.    Invalid input(s): \"ALPHOS\", \"TBIL\", \"DBIL\", \"TPROT\"    No results for input(s): \"PGLU\" in the last 168 hours.    Meds:   Scheduled:    aspirin  81 mg Oral Daily    lisinopril  40 mg Oral Daily    rosuvastatin  5 mg Oral Nightly    heparin  5,000 Units Subcutaneous Q8H PHILLY    chlorthalidone  12.5 mg Oral Daily    escitalopram  5 mg Oral Daily    tamsulosin  0.4 mg Oral Daily    cholecalciferol  1,000 Units Oral Daily     Continuous Infusions:       PRN:   benzonatate    acetaminophen    oxyCODONE **OR** oxyCODONE    HYDROmorphone **OR** HYDROmorphone    polyethylene glycol (PEG 3350)    sennosides    bisacodyl    fleet enema    ondansetron    prochlorperazine    calcium carbonate    ipratropium-albuterol    ASSESSMENT /  PLAN:     58 yr old male with PMH sig for HTN, HLD, and BPH who presents s/p L VATS for carcinoid tumor.     # Carcinoid tumor of lung  s/p L VATS  Post op care per CT surgery, CT removal pending Thoracic surgery eval today  Encourage IS use     # Essential HTN  controlled  lisinopril and chlorthalidone     # HLD  Cont statin     # BPH  Cont flomax     # Generalized anxiety d/o  - cont citalopram      Dispo: per thoracic surgery     Kayden Dailey DO  Hospitalist  [facility]

## 2024-05-15 VITALS
SYSTOLIC BLOOD PRESSURE: 120 MMHG | WEIGHT: 193 LBS | BODY MASS INDEX: 27.63 KG/M2 | RESPIRATION RATE: 18 BRPM | TEMPERATURE: 99 F | OXYGEN SATURATION: 97 % | HEIGHT: 70 IN | HEART RATE: 86 BPM | DIASTOLIC BLOOD PRESSURE: 76 MMHG

## 2024-05-15 PROCEDURE — 94799 UNLISTED PULMONARY SVC/PX: CPT

## 2024-05-15 RX ORDER — OXYCODONE HYDROCHLORIDE 5 MG/1
5 TABLET ORAL EVERY 4 HOURS PRN
Qty: 30 TABLET | Refills: 0 | Status: SHIPPED | OUTPATIENT
Start: 2024-05-15

## 2024-05-15 NOTE — PROGRESS NOTES
Thoracic Surgery Progress Note     Lior Brown is a 58 year old male. MRN [id number]. Admitted 5/10/2024    SUBJECTIVE/24H EVENTS:     No acute events overnight. Doing well. Ambulating and using IS. No new concerns.     Objective:     VITALS:     Temp (24hrs), Av.4 °F (36.9 °C), Min:98.2 °F (36.8 °C), Max:98.6 °F (37 °C)   /85 (BP Location: Left arm)   Pulse 75   Temp 98.4 °F (36.9 °C) (Oral)   Resp 22   Ht 177.8 cm (5' 10\")   Wt 193 lb (87.5 kg)   SpO2 96%   BMI 27.69 kg/m²     EXAM:   General: well appearing male in no acute distress  Heart: regular rate and rhythm.   Lungs: Normal respiratory effort. Equal chest rise bilaterally  Incisions: c/d/i   Chest tube:   Air Leak: none  Output: serosanguineous   24 hour: 110 cc  Suction: no  Abdomen: Soft, Non-tender, non-distended.  Extremities: No clubbing or cyanosis. No lateralizing weakness  Neuro: no focal defects  Psych:  oriented to person place and time, normal mood and affect      Intake/Output Summary (Last 24 hours) at 5/15/2024 0907  Last data filed at 5/15/2024 0700  Gross per 24 hour   Intake 270 ml   Output 110 ml   Net 160 ml         MEDS:   aspirin  81 mg Oral Daily    lisinopril  40 mg Oral Daily    rosuvastatin  5 mg Oral Nightly    heparin  5,000 Units Subcutaneous Q8H PHILLY    chlorthalidone  12.5 mg Oral Daily    escitalopram  5 mg Oral Daily    tamsulosin  0.4 mg Oral Daily    cholecalciferol  1,000 Units Oral Daily         Assessment/Plan:     58 year old male 5 days post op from left VATS upper lobectomy. No air leak seen on exam this morning. Chest tube clamped for one hour without air leak seen when unclamped.     -Chest tube removed.   -General diet.   -Pain control.   -Maintain saturations above 90%.   -Ambulate 3-4 times per day in the halls. Spend majority of day out of bed, in chair. Encouraged cough and IS use 10x hourly. Instructed how to use IS.    -Okay to discharge from thoracic surgery standpoint once cleared  with other services. Follow up in 1-2 weeks.      Yamileth Amaya PA-C  Thoracic Surgery  Pager: [phone]

## 2024-05-15 NOTE — PLAN OF CARE
Patient alert and oriented x 4.pleasant and cooperating well, On room air.Lungs sounds clear , abd soft +BS,+PP , no edema .Cpap during night tolerating good , sinus  on cardiac monitor. Patient denies any chest pain or chest discomfort at this time. Patient voids, last BM 5/15, small one, stool softener helped,ambulated on hallway ,tolerating good, no acute distress noted  .resting time no Air leak noted in water seal ,total 80 ml serosanguinous drainage in chamber during this shift. Plan of care updated, all questions answered. Safety precautions in place. Bed alarm on. Will continue to monitor tele/labs/vital signs closely. Patient comfortable, rested well , no acute distress noted .     Problem: Patient/Family Goals  Goal: Patient/Family Long Term Goal  Description: Patient's Long Term Goal: to go home    Interventions:  - ambulate in hallway  - See additional Care Plan goals for specific interventions  Outcome: Progressing  Goal: Patient/Family Short Term Goal  Description: Patient's Short Term Goal: manage pain    Interventions:   - PRN pain medication available  - See additional Care Plan goals for specific interventions  Outcome: Progressing     Problem: CARDIOVASCULAR - ADULT  Goal: Maintains optimal cardiac output and hemodynamic stability  Description: INTERVENTIONS:  - Monitor vital signs, rhythm, and trends  - Monitor for bleeding, hypotension and signs of decreased cardiac output  - Evaluate effectiveness of vasoactive medications to optimize hemodynamic stability  - Monitor arterial and/or venous puncture sites for bleeding and/or hematoma  - Assess quality of pulses, skin color and temperature  - Assess for signs of decreased coronary artery perfusion - ex. Angina  - Evaluate fluid balance, assess for edema, trend weights  Outcome: Progressing     Problem: SAFETY ADULT - FALL  Goal: Free from fall injury  Description: INTERVENTIONS:  - Assess pt frequently for physical needs  - Identify cognitive and  physical deficits and behaviors that affect risk of falls.  - [place] fall precautions as indicated by assessment.  - Educate pt/family on patient safety including physical limitations  - Instruct pt to call for assistance with activity based on assessment  - Modify environment to reduce risk of injury  - Provide assistive devices as appropriate  - Consider OT/PT consult to assist with strengthening/mobility  - Encourage toileting schedule  Outcome: Progressing     Problem: PAIN - ADULT  Goal: Verbalizes/displays adequate comfort level or patient's stated pain goal  Description: INTERVENTIONS:  - Encourage pt to monitor pain and request assistance  - Assess pain using appropriate pain scale  - Administer analgesics based on type and severity of pain and evaluate response  - Implement non-pharmacological measures as appropriate and evaluate response  - Consider cultural and social influences on pain and pain management  - Manage/alleviate anxiety  - Utilize distraction and/or relaxation techniques  - Monitor for opioid side effects  - Notify MD/LIP if interventions unsuccessful or patient reports new pain  - Anticipate increased pain with activity and pre-medicate as appropriate  Outcome: Progressing

## 2024-05-15 NOTE — PLAN OF CARE
Assumed patient care around 0730 this Am. Patient alert and oriented x4. Spo2 maintained on RA. Left chest tube to water seal. No subcutaneous emphysema noted. IS best effort 1750. IS use encouraged. NSR on tele. Heparin subcutaneous for DVT prophylaxis. Patient continent of bowel and bladder, last BM today 5/15 per patient. Denies pain at this time. Patient is up with standby assist in the room. Updated on POC. Needs met.     Approx 1000: Chest tube pulled by GREG Carson. No complications noted.         Problem: Patient/Family Goals  Goal: Patient/Family Long Term Goal  Description: Patient's Long Term Goal: to go home    Interventions:  - ambulate in hallway  - See additional Care Plan goals for specific interventions  Outcome: Adequate for Discharge  Goal: Patient/Family Short Term Goal  Description: Patient's Short Term Goal: manage pain    Interventions:   - PRN pain medication available  - See additional Care Plan goals for specific interventions  Outcome: Adequate for Discharge     Problem: CARDIOVASCULAR - ADULT  Goal: Maintains optimal cardiac output and hemodynamic stability  Description: INTERVENTIONS:  - Monitor vital signs, rhythm, and trends  - Monitor for bleeding, hypotension and signs of decreased cardiac output  - Evaluate effectiveness of vasoactive medications to optimize hemodynamic stability  - Monitor arterial and/or venous puncture sites for bleeding and/or hematoma  - Assess quality of pulses, skin color and temperature  - Assess for signs of decreased coronary artery perfusion - ex. Angina  - Evaluate fluid balance, assess for edema, trend weights  Outcome: Adequate for Discharge     Problem: SAFETY ADULT - FALL  Goal: Free from fall injury  Description: INTERVENTIONS:  - Assess pt frequently for physical needs  - Identify cognitive and physical deficits and behaviors that affect risk of falls.  - [place] fall precautions as indicated by assessment.  - Educate pt/family on patient safety  including physical limitations  - Instruct pt to call for assistance with activity based on assessment  - Modify environment to reduce risk of injury  - Provide assistive devices as appropriate  - Consider OT/PT consult to assist with strengthening/mobility  - Encourage toileting schedule  Outcome: Adequate for Discharge     Problem: PAIN - ADULT  Goal: Verbalizes/displays adequate comfort level or patient's stated pain goal  Description: INTERVENTIONS:  - Encourage pt to monitor pain and request assistance  - Assess pain using appropriate pain scale  - Administer analgesics based on type and severity of pain and evaluate response  - Implement non-pharmacological measures as appropriate and evaluate response  - Consider cultural and social influences on pain and pain management  - Manage/alleviate anxiety  - Utilize distraction and/or relaxation techniques  - Monitor for opioid side effects  - Notify MD/LIP if interventions unsuccessful or patient reports new pain  - Anticipate increased pain with activity and pre-medicate as appropriate  Outcome: Adequate for Discharge     Problem: RESPIRATORY - ADULT  Goal: Achieves optimal ventilation and oxygenation  Description: INTERVENTIONS:  - Assess for changes in respiratory status  - Assess for changes in mentation and behavior  - Position to facilitate oxygenation and minimize respiratory effort  - Oxygen supplementation based on oxygen saturation or ABGs  - Provide Smoking Cessation handout, if applicable  - Encourage broncho-pulmonary hygiene including cough, deep breathe, Incentive Spirometry  - Assess the need for suctioning and perform as needed  - Assess and instruct to report SOB or any respiratory difficulty  - Respiratory Therapy support as indicated  - Manage/alleviate anxiety  - Monitor for signs/symptoms of CO2 retention  Outcome: Adequate for Discharge

## 2024-05-15 NOTE — DISCHARGE PLANNING
Reviewed AVS with patient. Reviewed incision care with patient. Verbalized understanding. PIV removed, tele discontinued. Patient transported via wheelchair to [facility] for discharge home with family.

## 2024-05-15 NOTE — DISCHARGE INSTRUCTIONS
Thoracic Surgery Post-Operative Appointment     Follow up appointment scheduled: with Dr. Chaidez on May 29th at 12 noon located at the [facility].  Thank you.      Thoracic Surgery Discharge Instructions     Pain Management  You will be sent home with a prescription for pain medicine.  This will likely be a narcotic pain medicine such as Oxycodone or Norco (hydrocodone/acetaminophen).  If no contraindications, start with extra strength acetaminophen (Tylenol) or ibuprofen (Advil/Motrin) scheduled, and use narcotics for breakthrough pain. Ice packs can be helpful as well for surface level, skin incision pain.      - Take extra strength acetaminophen (Tylenol) 500 mg every 4 to 6 hours, as long as you have no known liver conditions.   - **Max dose for acetaminophen (Tylenol) is 4000 mg per day. If taking Norco, please note that each tab contains 325 mg of acetaminophen (Tylenol).  - Unless you have kidney problems, ibuprofen 600 mg every 6 hours can be used along with Tylenol for additional pain control.    Restrictions  Upon discharge home, do not get in an airplane or soak in a tub/pool until after your first follow up to see me in the office. You may shower, washing incisions gently with soap and water.    Other than that, no activity restrictions. You should attempt to be as active as possible. What ever you feel up to doing, you can do. Walking is strongly encouraged. You may drive (not within 4 hours of taking prescription pain medications).     No dietary restrictions. If taking prescription pain medications you may become constipated. Fluids, fiber and over the counter stool softeners are helpful for this.    Exercises  Do range of motion exercises twice a day with the arm on the side of your operation. The easiest is to \"walk\" your hand up the wall with your fingers. Eventually you should be able to get your arm straight up over your head.    You will be sent home with your breathing \"toys\" --  like your incentive spirometer. Use this frequently at home. 10 times per hour while awake, if possible. If watching TV, use it at every commercial break.    Follow-up Appointment  Our office will reach out to you regarding a follow up appointment, if not already scheduled. Appointment will be approximately 1-2 weeks after discharge.     If you are experiencing any of these symptoms, please call our office at [phone]. Office hours are Monday through Friday, 8 am to 4:30 pm.  If you are calling the office after hours, please call the Thoracic Surgery On-Call number [phone], and state that you are a patient from [facility].      - Persistent fevers of 101.5 or greater  - Worsening pain  - Worsening shortness of breath  - Signs of infection in your wound such as drainage, worsening of redness, swelling or     pain.  - Anything else that concerns you.

## 2024-05-15 NOTE — PROGRESS NOTES
Pulmonary Progress Note     Assessment / Plan:  KYRIE carcinoid - s/p KYRIE lobectomy 5/10/24  - postop management per surgery  - chest tube management per surgery   MARLENE  - CPAP when sleeping  Dispo  - follow-up with MD or APN in 1-2 weeks after DC      Subjective:  No complaints. Wants to go home.    Objective:  Vitals:    05/14/24 1658 05/14/24 1900 05/15/24 0430 05/15/24 0940   BP: 122/80 128/84 127/85 125/75   BP Location: Left arm  Left arm Left arm   Pulse: 88 93 75 83   Resp: 15 21 22 23   Temp: 98.2 °F (36.8 °C)  98.4 °F (36.9 °C)    TempSrc: Oral  Oral    SpO2: 97%  96%    Weight:       Height:         Physical Exam:  General: no apparent distress, conversant  Skin: no rash, ulcers or subcutaneous nodules  Eyes: anicteric sclerae, moist conjunctivae  Head, ears, nose, throat: atraumatic, oropharynx clear with moist mucous membranes  Neck: trachea midline with no thyromegaly  Heart: regular rate and rhythm, no murmurs / rubs / gallops  Lungs: clear bilaterally, normal respiratory effort, no accessory muscle use  Extremities: no edema or cyanosis  Psych: interactive, answering questions appropriately, appropriate affect    Medications:  Reviewed in EMR    Lab Data:  Reviewed in EMR    Imaging:  I independently visualized all relevant chest imaging in PACS and agree with radiology interpretation except where noted.     96

## 2024-05-15 NOTE — PROGRESS NOTES
HANY Hospitalist Progress Note                                                                     [facility]   [facility]  6/25/1965    SUBJECTIVE: Seen and examined at bedside.  No issues. Chest tube removed.      OBJECTIVE:  Temp:  [98.2 °F (36.8 °C)-98.6 °F (37 °C)] 98.4 °F (36.9 °C)  Pulse:  [75-94] 83  Resp:  [10-23] 23  BP: (110-128)/(75-85) 125/75  SpO2:  [96 %-100 %] 96 %  Exam  Gen: No acute distress, alert and oriented, in good spirit  Pulm: Crackles on the left lower lobe, normal respiratory effort,  no wheezing, chest tube in place with airleak, serosanguineous fluid noted  CV: Heart with regular rate and rhythm, no murmur.    Abd: Abdomen soft, nontender, nondistended, bowel sounds present  MSK: No significant pitting edema or tenderness of the LE  Skin: no new rashes or lesions    Labs:   Recent Labs   Lab 05/14/24  1228   HGB 13.4       No results for input(s): \"NA\", \"K\", \"CL\", \"CO2\", \"BUN\", \"CREATSERUM\", \"CA\", \"CAION\", \"MG\", \"PHOS\", \"GLU\" in the last 168 hours.    No results for input(s): \"ALT\", \"AST\", \"ALB\", \"AMYLASE\", \"LIPASE\", \"LDH\" in the last 168 hours.    Invalid input(s): \"ALPHOS\", \"TBIL\", \"DBIL\", \"TPROT\"    No results for input(s): \"PGLU\" in the last 168 hours.    Meds:   Scheduled:    aspirin  81 mg Oral Daily    lisinopril  40 mg Oral Daily    rosuvastatin  5 mg Oral Nightly    heparin  5,000 Units Subcutaneous Q8H PHILLY    chlorthalidone  12.5 mg Oral Daily    escitalopram  5 mg Oral Daily    tamsulosin  0.4 mg Oral Daily    cholecalciferol  1,000 Units Oral Daily     Continuous Infusions:       PRN:   benzonatate    acetaminophen    oxyCODONE **OR** oxyCODONE    HYDROmorphone **OR** HYDROmorphone    polyethylene glycol (PEG 3350)    sennosides    bisacodyl    fleet enema    ondansetron    prochlorperazine    calcium carbonate    ipratropium-albuterol    ASSESSMENT / PLAN:     58 yr old male with PMH  sig for HTN, HLD, and BPH who presents s/p L VATS for carcinoid tumor.     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  Encourage IS use     # Essential HTN  controlled  lisinopril and chlorthalidone     # HLD  Cont statin     # BPH  Cont flomax     # Generalized anxiety d/o  - cont citalopram      Dispo: per thoracic surgery     Kayden Dailey DO  Hospitalist  [facility]

## 2024-05-15 NOTE — DISCHARGE SUMMARY
[facility]   part of formerly [facility]    Discharge Summary    Ramanuj Stephanie Patient Status:  Inpatient    1965 MRN [id number]   Location [facility] 8NE-A Attending Dm Laura, Sundeep BREWER MD   Hosp Day # 5 PCP Ximena Doran MD     Date of Admission: 5/10/2024 Disposition: Home or Self Care     Date of Discharge: 5/15/2024    Admitting Diagnosis: LUNG CANCER  Carcinoid tumor of left lung (HCC)    Discharge Diagnosis:   Patient Active Problem List   Diagnosis    Essential hypertension    Anxiety state    GERD (gastroesophageal reflux disease)    Elevated cholesterol    MARLENE (obstructive sleep apnea)  AHI 23    Achilles tendinitis    Osteoarthrosis, localized, primary, knee    Ankle pain    Knee pain    Vitamin D deficiency    Osteopenia    Chest pressure    PVC's (premature ventricular contractions)    Gout    Lumbar radiculopathy    Carcinoid tumor of left lung (HCC)       Reason for Admission: LUNG CANCER    Physical Exam:  General: well appearing male in no acute distress  HEENT: Normocephalic, PERRL, EOMI, no scleral icterus  Heart: regular rate and rhythm. No lower extremity edema.  Lungs: Normal respiratory effort. Equal chest rise bilaterally.   Chest: wounds clean, dry and intact.  No erythema, swelling, drainage or other signs of infection  Abdomen: Soft, Non-tender, non-distended. No hepatosplenomegaly noted.  Extremities: No clubbing or cyanosis. No lateralizing weakness  Neuro: No gross cranial nerve defects, no loss of sensation  Psych: oriented to person place and time, normal mood and affect     History of Present Illness: Patient is a 58 year old male admitted for surgery for carcinoid tumor of the lung on 5/10/2024    Hospital Course: Patient underwent surgery on 5/10/2024. Final pathology is pending. He did well post-operatively. After an initial stay in the ICU, he went to the general floor. There he was able to get up and move around on their own and tolerate a general  diet. Once the air leak had stopped and drainage was at acceptable levels, the chest tubes were pulled and the patient was sent home in good condition.    Final Pathology: pending    Consultations: Pulmonology, Internal Medicine    Procedures: FLEXIBLE BRONCHOSCOPY; VIDEO-ASSISTED THORACOSCOPIC SURGERY; LEFT UPPER LOBECTOMY; MEDIASTINAL LYMPH NODE DISSECTION    Complications: none    Discharge Condition: Good    Discharge Medications:      Discharge Medications        START taking these medications        Instructions Prescription details   oxyCODONE 5 MG Tabs      Take 1 tablet (5 mg total) by mouth every 4 (four) hours as needed for Pain.   Quantity: 30 tablet  Refills: 0            CHANGE how you take these medications        Instructions Prescription details   citalopram 20 MG Tabs  Commonly known as: CeleXA  What changed: how much to take      TAKE 1 TABLET DAILY   Quantity: 90 tablet  Refills: 0            CONTINUE taking these medications        Instructions Prescription details   aspirin 81 MG Chew      Chew 1 tablet (81 mg total) by mouth daily.   Quantity: 100 tablet  Refills: 3     chlorthalidone 25 MG Tabs  Commonly known as: Hygroton      Take 0.5 tablets (12.5 mg total) by mouth daily.   Quantity: 45 tablet  Refills: 3     cholecalciferol 25 MCG (1000 UT) Tabs  Commonly known as: Vitamin D3      Take 1 tablet (1,000 Units total) by mouth daily.   Refills: 0     CO Q 10 OR      Take by mouth.   Refills: 0     lisinopril 40 MG Tabs  Commonly known as: Prinivil; Zestril      Take 1 tablet (40 mg total) by mouth daily.   Refills: 0     Multi-Vitamin Daily Tabs      Take by mouth.   Refills: 0     rosuvastatin 5 MG Tabs  Commonly known as: Crestor      Take 1 tablet (5 mg total) by mouth nightly.   Quantity: 90 tablet  Refills: 3     tamsulosin 0.4 MG Caps  Commonly known as: Flomax      Take 1 capsule (0.4 mg total) by mouth daily. Due for yearly follow up. Please schedule to continue receiving refills.    Quantity: 90 capsule  Refills: 0               Where to Get Your Medications        These medications were sent to [facility]/pharmacy #[id number] - [place], [place] - [address], [phone], [phone]  [address], [facility] [address]      Phone: [phone]   oxyCODONE 5 MG Tabs         Follow up Visits: Follow-up with Dr. Chaidez in 1-2 weeks.     Other Discharge Instructions: see separate d/c instructions    Time Spent: 35 minutes    Yamileth Amaya PA-C  5/15/2024

## 2024-05-16 NOTE — PAYOR COMM NOTE
--------------  DISCHARGE REVIEW----CLINICALS FOR 5/12 5/13 5/14 WITH DISCHARGE ON 5/15      Payor: [employer]  Subscriber #:  [id number]  Authorization Number: [id number]    Admit date: 5/10/24  Admit time:  10:10 AM  Discharge Date: 5/15/2024  3:31 PM     Admitting Physician: Sundeep Chaidez Jr., MD  Attending Physician:  No att. providers found  Primary Care Physician: Ximena Doran MD      5/12   [facility] Hospitalist Progress Note                                                                                    [facility]   part of [facility]  6/25/1965     SUBJECTIVE: no chest pain, palpitations, shortness of breath, cough, nausea, vomiting, abdominal pain. Post operative pain controlled.      OBJECTIVE:  Temp:  [97.5 °F (36.4 °C)-98.7 °F (37.1 °C)] 97.5 °F (36.4 °C)  Pulse:  [65-88] 68  Resp:  [13-22] 20  BP: (106-147)/(70-95) 122/78  SpO2:  [97 %-100 %] 99 %  Exam  Gen: No acute distress, alert and oriented  Pulm: Lungs clear bilaterally, normal respiratory effort, no crackles, no wheezing, chest tube in place  CV: Heart with regular rate and rhythm, no murmur.    Abd: Abdomen soft, nontender, nondistended, bowel sounds present  MSK: No significant pitting edema or tenderness of the LE  Skin: no new rashes or lesions     Labs:   No results for input(s): \"WBC\", \"HGB\", \"MCV\", \"PLT\", \"BAND\", \"INR\" in the last 168 hours.     Invalid input(s): \"LYM#\", \"MONO#\", \"BASOS#\", \"EOSIN#\"     No results for input(s): \"NA\", \"K\", \"CL\", \"CO2\", \"BUN\", \"CREATSERUM\", \"CA\", \"CAION\", \"MG\", \"PHOS\", \"GLU\" in the last 168 hours.     No results for input(s): \"ALT\", \"AST\", \"ALB\", \"AMYLASE\", \"LIPASE\", \"LDH\" in the last 168 hours.     Invalid input(s): \"ALPHOS\", \"TBIL\", \"DBIL\", \"TPROT\"     No results for input(s): \"PGLU\" in the last 168 hours.     Meds:   Scheduled:    aspirin  81 mg Oral Daily    lisinopril  40 mg Oral Daily    rosuvastatin  5 mg Oral Nightly    heparin  5,000 Units Subcutaneous  Q8H [facility]    acetaminophen  1,000 mg Intravenous Q6H    chlorthalidone  12.5 mg Oral Daily    escitalopram  5 mg Oral Daily    tamsulosin  0.4 mg Oral Daily    cholecalciferol  1,000 Units Oral Daily      Continuous Infusions:         PRN:   oxyCODONE **OR** oxyCODONE    HYDROmorphone **OR** HYDROmorphone    polyethylene glycol (PEG 3350)    sennosides    bisacodyl    fleet enema    ondansetron    prochlorperazine    calcium carbonate    ipratropium-albuterol     ASSESSMENT / PLAN:     58 yr old male with PMH sig for HTN, HLD, and BPH who presents s/p L VATS for carcinoid tumor.     # Carcinoid tumor of lung   s/p L VATS  Post op care per CT surger  Encourage IS use     # Essential HTN  controlled  lisinopril and chlorthalidone     # HLD  Cont statin     # BPH  Cont flomax     # Generalized anxiety d/o  - cont citalopram      Dispo: per thoracic surgery      Ag Guillen MD  [facility] Hospitalist      5/13    [facility] Hospitalist Progress Note                                                                                    [facility]   part of [facility]  6/25/1965     SUBJECTIVE: no chest pain, palpitations, shortness of breath, cough, nausea, vomiting, abdominal pain. Post operative pain controlled.      OBJECTIVE:  Temp:  [97.5 °F (36.4 °C)-98.7 °F (37.1 °C)] 97.8 °F (36.6 °C)  Pulse:  [65-93] 90  Resp:  [17-23] 17  BP: (122-147)/(78-95) 138/91  SpO2:  [97 %-99 %] 98 %  Exam  Gen: No acute distress, alert and oriented  Pulm: Lungs clear bilaterally, normal respiratory effort, no crackles, no wheezing, chest tube in place  CV: Heart with regular rate and rhythm, no murmur.    Abd: Abdomen soft, nontender, nondistended, bowel sounds present  MSK: No significant pitting edema or tenderness of the LE  Skin: no new rashes or lesions      Meds:   Scheduled:    aspirin  81 mg Oral Daily    lisinopril  40 mg Oral Daily    rosuvastatin  5 mg Oral Nightly    heparin  5,000  Units Subcutaneous Q8H [facility]    chlorthalidone  12.5 mg Oral Daily    escitalopram  5 mg Oral Daily    tamsulosin  0.4 mg Oral Daily    cholecalciferol  1,000 Units Oral Daily      Continuous Infusions:         PRN:   acetaminophen    oxyCODONE **OR** oxyCODONE    HYDROmorphone **OR** HYDROmorphone    polyethylene glycol (PEG 3350)    sennosides    bisacodyl    fleet enema    ondansetron    prochlorperazine    calcium carbonate    ipratropium-albuterol     ASSESSMENT / PLAN:     58 yr old male with PMH sig for HTN, HLD, and BPH who presents s/p L VATS for carcinoid tumor.     # Carcinoid tumor of lung  s/p L VATS  Post op care per CT surgery, CT removal pending Thoracic surgery eval today  Encourage IS use     # Essential HTN  controlled  lisinopril and chlorthalidone     # HLD  Cont statin     # BPH  Cont flomax     # Generalized anxiety d/o  - cont citalopram      Dispo: per thoracic surgery      Ag Guillen MD  [facility]    5[facility]14  S: He is feeling ok, pain is reasonably controlled but it hurts when he coughs.     Meds:   aspirin  81 mg Oral Daily    lisinopril  40 mg Oral Daily    rosuvastatin  5 mg Oral Nightly    heparin  5,000 Units Subcutaneous Q8H [facility]    chlorthalidone  12.5 mg Oral Daily    escitalopram  5 mg Oral Daily    tamsulosin  0.4 mg Oral Daily    cholecalciferol  1,000 Units Oral Daily         Prn Meds:    benzonatate    acetaminophen    oxyCODONE **OR** oxyCODONE    HYDROmorphone **OR** HYDROmorphone    polyethylene glycol (PEG 3350)    sennosides    bisacodyl    fleet enema    ondansetron    prochlorperazine    calcium carbonate    ipratropium-albuterol     Infusions:        OBJECTIVE:         Vitals:     05/13/24 2111 05/13/24 2300 05/14/24 0400 05/14/24 0833   BP:   138/83 (!) 144/91 (!) 130/92   Pulse: 102 89 74 77   Resp: 24 22 17 23   Temp:   98.7 °F (37.1 °C) 98.1 °F (36.7 °C) 98.2 °F (36.8 °C)   TempSrc:   Oral Oral Oral   SpO2:       98%   Weight:           Height:               O2: room air     Gen - Alert, oriented x 3, in no apparent distress  Lungs - slight crackles at bases. Left chest tube in place, + air leak when coughing  CV - regular rate & rhythm. Normal S1, S2. No murmurs, rubs, or gallops appreciated.  Abdomen - soft, nontender to palpation   Extremities - No cyanosis, clubbing, edema appreciated.        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 Still has air leak from chest tube.  - postop management per surgery  - chest tube management per surgery   MARLENE: on cpap   - cont cpap with sleep   Dispo  -full code  -home when chest tube removed and when ok with thoracic surgery     We will continue to follow with you      Aquilino Loo M.D.  Pulmonary/Critical Care       5/15 DISCHARGED        Discharge Summary Notes        Discharge Summary signed by Yamileth Amaya PA-C at 5/15/2024 10:14 AM       Author: Yamileth Amaya PA-C Specialty: Physician Assistant Surgical, CARDIOLOGY Author Type: Physician Assistant    Filed: 5/15/2024 10:14 AM Date of Service: 5/15/2024  9:08 AM Status: Signed    : Yamileth Amaya PA-C (Physician Assistant)           [facility]   part of [facility]    Discharge Summary    Lior Brown Patient Status:  Inpatient    1965 MRN [id number]   Location [facility] 8NE-A Attending Sundeep Chaidez Jr., MD   Hosp Day # 5 PCP Ximena Doran MD     Date of Admission: 5/10/2024 Disposition: Home or Self Care     Date of Discharge: 5/15/2024    Admitting Diagnosis: LUNG CANCER  Carcinoid tumor of left lung (HCC)    Discharge Diagnosis:   Patient Active Problem List   Diagnosis    Essential hypertension    Anxiety state    GERD (gastroesophageal reflux disease)    Elevated cholesterol    MARLENE (obstructive sleep apnea)  AHI 23    Achilles tendinitis    Osteoarthrosis, localized, primary, knee    Ankle pain    Knee pain    Vitamin D deficiency    Osteopenia    Chest pressure    PVC's  (premature ventricular contractions)    Gout    Lumbar radiculopathy    Carcinoid tumor of left lung (HCC)       Reason for Admission: LUNG CANCER    Physical Exam:  General: well appearing male in no acute distress  HEENT: Normocephalic, PERRL, EOMI, no scleral icterus  Heart: regular rate and rhythm. No lower extremity edema.  Lungs: Normal respiratory effort. Equal chest rise bilaterally.   Chest: wounds clean, dry and intact.  No erythema, swelling, drainage or other signs of infection  Abdomen: Soft, Non-tender, non-distended. No hepatosplenomegaly noted.  Extremities: No clubbing or cyanosis. No lateralizing weakness  Neuro: No gross cranial nerve defects, no loss of sensation  Psych: oriented to person place and time, normal mood and affect     History of Present Illness: Patient is a 58 year old male admitted for surgery for carcinoid tumor of the lung on 5/10/2024    Hospital Course: Patient underwent surgery on 5/10/2024. Final pathology is pending. He did well post-operatively. After an initial stay in the ICU, he went to the general floor. There he was able to get up and move around on their own and tolerate a general diet. Once the air leak had stopped and drainage was at acceptable levels, the chest tubes were pulled and the patient was sent home in good condition.    Final Pathology: pending    Consultations: Pulmonology, Internal Medicine    Procedures: FLEXIBLE BRONCHOSCOPY; VIDEO-ASSISTED THORACOSCOPIC SURGERY; LEFT UPPER LOBECTOMY; MEDIASTINAL LYMPH NODE DISSECTION    Complications: none    Discharge Condition: Good    Discharge Medications:      Discharge Medications        START taking these medications        Instructions Prescription details   oxyCODONE 5 MG Tabs      Take 1 tablet (5 mg total) by mouth every 4 (four) hours as needed for Pain.   Quantity: 30 tablet  Refills: 0            CHANGE how you take these medications        Instructions Prescription details   citalopram 20 MG  Tabs  Commonly known as: CeleXA  What changed: how much to take      TAKE 1 TABLET DAILY   Quantity: 90 tablet  Refills: 0            CONTINUE taking these medications        Instructions Prescription details   aspirin 81 MG Chew      Chew 1 tablet (81 mg total) by mouth daily.   Quantity: 100 tablet  Refills: 3     chlorthalidone 25 MG Tabs  Commonly known as: Hygroton      Take 0.5 tablets (12.5 mg total) by mouth daily.   Quantity: 45 tablet  Refills: 3     cholecalciferol 25 MCG (1000 UT) Tabs  Commonly known as: Vitamin D3      Take 1 tablet (1,000 Units total) by mouth daily.   Refills: 0     CO Q 10 OR      Take by mouth.   Refills: 0     lisinopril 40 MG Tabs  Commonly known as: Prinivil; Zestril      Take 1 tablet (40 mg total) by mouth daily.   Refills: 0     Multi-Vitamin Daily Tabs      Take by mouth.   Refills: 0     rosuvastatin 5 MG Tabs  Commonly known as: Crestor      Take 1 tablet (5 mg total) by mouth nightly.   Quantity: 90 tablet  Refills: 3     tamsulosin 0.4 MG Caps  Commonly known as: Flomax      Take 1 capsule (0.4 mg total) by mouth daily. Due for yearly follow up. Please schedule to continue receiving refills.   Quantity: 90 capsule  Refills: 0               Where to Get Your Medications        These medications were sent to [facility]/pharmacy #[id number] - [place] [address] [address], [phone], [phone]  [address] [facility] [address]      Phone: [phone]   oxyCODONE 5 MG Tabs         Follow up Visits: Follow-up with Dr. Chaidez in 1-2 weeks.     Other Discharge Instructions: see separate d/c instructions    Time Spent: 35 minutes    Yamileth Amaya PA-C  5/15/2024      Electronically signed by Yamileth Amaya PA-C on 5/15/2024 10:14 AM         REVIEWER COMMENTS

## 2024-05-29 ENCOUNTER — OFFICE VISIT (OUTPATIENT)
Dept: HEMATOLOGY/ONCOLOGY | Facility: HOSPITAL | Age: 59
End: 2024-05-29
Attending: THORACIC SURGERY (CARDIOTHORACIC VASCULAR SURGERY)

## 2024-05-29 VITALS
HEART RATE: 84 BPM | OXYGEN SATURATION: 100 % | DIASTOLIC BLOOD PRESSURE: 77 MMHG | RESPIRATION RATE: 16 BRPM | WEIGHT: 200 LBS | HEIGHT: 70.51 IN | TEMPERATURE: 98 F | BODY MASS INDEX: 28.31 KG/M2 | SYSTOLIC BLOOD PRESSURE: 132 MMHG

## 2024-05-29 DIAGNOSIS — D3A.090 CARCINOID TUMOR OF LEFT LUNG (HCC): Primary | ICD-10-CM

## 2024-05-29 PROCEDURE — 99211 OFF/OP EST MAY X REQ PHY/QHP: CPT

## 2024-05-29 NOTE — PROGRESS NOTES
Thoracic Surgery Post-Op Progress Note    Mr. Brown is a 58 year old male who presents today in follow up after a left VATS upper lobectomy performed on 5/10/24. He is doing well. He complains of a dry cough. Intermittent voice hoarseness. He also reports some left sided pressure, more noticeable with cough. He denies fevers or chills. No cough. No hemoptysis. No shortness of breath. No worsening pain, swelling, or drainage from wounds. He has been staying active and using incentive spirometry at home. He has been getting IS to >2000 and went for a mile walk the other day.     PMH:  Past Medical History:    Anxiety    BPH (benign prostatic hyperplasia)    GERD    Gout    High cholesterol    HTN (hypertension)    Hyperlipidemia    MARLENE (obstructive sleep apnea)    PSg at [facility] 2/5/10 AHI 23 with significant O2 desaturation    Sleep apnea       Meds:    Current Outpatient Medications:     oxyCODONE 5 MG Oral Tab, Take 1 tablet (5 mg total) by mouth every 4 (four) hours as needed for Pain., Disp: 30 tablet, Rfl: 0    Coenzyme Q10 (CO Q 10 OR), Take by mouth., Disp: , Rfl:     CITALOPRAM 20 MG Oral Tab, TAKE 1 TABLET DAILY (Patient taking differently: Take 0.5 tablets (10 mg total) by mouth daily.), Disp: 90 tablet, Rfl: 0    tamsulosin (FLOMAX) cap, Take 1 capsule (0.4 mg total) by mouth daily. Due for yearly follow up. Please schedule to continue receiving refills., Disp: 90 capsule, Rfl: 0    lisinopril 40 MG Oral Tab, Take 1 tablet (40 mg total) by mouth daily., Disp: , Rfl:     Multiple Vitamin (MULTI-VITAMIN DAILY) Oral Tab, Take by mouth., Disp: , Rfl:     aspirin 81 MG Oral Chew Tab, Chew 1 tablet (81 mg total) by mouth daily., Disp: 100 tablet, Rfl: 3    rosuvastatin 5 MG Oral Tab, Take 1 tablet (5 mg total) by mouth nightly., Disp: 90 tablet, Rfl: 3    chlorthalidone 25 MG Oral Tab, Take 0.5 tablets (12.5 mg total) by mouth daily., Disp: 45 tablet, Rfl: 3    Vitamin D3 (VITAMIN D3) 1000 UNITS Oral Tab,  Take 1 tablet (1,000 Units total) by mouth daily., Disp: , Rfl:     Vital Signs:    /77 (BP Location: Left arm, Patient Position: Sitting, Cuff Size: large)   Pulse 84   Temp 98.2 °F (36.8 °C) (Tympanic)   Resp 16   Ht 1.791 m (5' 10.51\")   Wt 90.7 kg (200 lb)   SpO2 100%   BMI 28.28 kg/m²     Physical Exam:    General: well appearing male in no acute distress  HEENT: Normocephalic, PERRL, EOMI  Heart: regular rate and rhythm. No murmurs, rubs or gallops. No lower extremity edema.  Lungs: Normal respiratory effort. Clear to ascultation bilaterally.  Chest: incisions are clean, dry and intact. No signs of infection. Chest tube stitch removed.  Abdomen: Soft, Non-tender, non-distended. No hepatosplenomegaly noted.  Extremities: No clubbing or cyanosis. No lateralizing weakness  Neuro: No gross cranial nerve defects, no loss of sensation  Psych: oriented to person place and time, normal mood and affect    Pathology:  Final Diagnosis:   A.  Level 5 lymph nodes, excision:  -Four lymph nodes, negative for tumor (0/4).     B.  Level 6 lymph nodes, excision:  -Three lymph nodes, negative for tumor (0/3).     C.  Level 11 lymph nodes, excision:  -Nine lymph nodes, negative for tumor (0/9).     D.  Level 12 lymph nodes, excision:  -Six lymph nodes, negative for tumor (0/6).     E.  Lung, left upper lobe, lobectomy:  -Typical carcinoid tumor (neuroendocrine tumor grade 1), up to 3.1 cm.  -The margins are negative for tumor (closest margin: bronchial at 0.2 cm).  -See comment.     F.  Level 7 lymph nodes, excision:  -Two lymph nodes, negative for tumor (0/2).       Assesment:    Mr. Brown is a 58 year old male who presents today in follow up after a left VATS upper lobectomy performed on 5/10/24 for neuroendocrine tumor. Final pathology revealed typical carcinoid tumor, T2aN0. He is doing quite well. Mr. Brown has no apparent complications from the surgery. We will continue to follow him as needed in  the postoperative period. Long term surveillance will be with Dr. Loo. He has no restriction and we have encouraged an active lifestyle.     Plan:  Encouraged continued exercise and incentive spirometer use.  No activity restrictions  Mr. Brown should follow up with thoracic surgery on an as needed basis. He is encouraged to call with any further questions or concerns.     VAISHALI WakefieldC  Thoracic Surgery

## (undated) DEVICE — ABSORBABLE HEMOSTAT (OXIDIZED REGENERATED CELLULOSE): Brand: SURGICEL

## (undated) DEVICE — MEDI-VAC SUCTION HANDLE REGULAR CAPACITY: Brand: CARDINAL HEALTH

## (undated) DEVICE — DRAIN CHST SGL COLL 1 PT TB FOR ATS BG CMPTBL

## (undated) DEVICE — STERILE POLYISOPRENE POWDER-FREE SURGICAL GLOVES: Brand: PROTEXIS

## (undated) DEVICE — SUT VCRL 2-0 36IN CT-1 ABSRB UD L36MM 1/2 CIR

## (undated) DEVICE — INSULATED BLADE ELECTRODE;CAUTION: FOR MANUFACTURING, PROCESSING, OR REPACKING.: Brand: EDGE

## (undated) DEVICE — ARYGLE SUCTION CATHETER WITH CHIMNEY VALVE STRIAGHT PACKED 14 FR/ CH: Brand: ARGYLE

## (undated) DEVICE — CV PACK-LF: Brand: MEDLINE INDUSTRIES, INC.

## (undated) DEVICE — FORCEPS BX 100CM 1.8MM RJ STD

## (undated) DEVICE — APPLICATOR STD 6IN COT TIP WOOD HNDL ST

## (undated) DEVICE — 1200CC GUARDIAN II: Brand: GUARDIAN

## (undated) DEVICE — MEDI-VAC NON-CONDUCTIVE SUCTION TUBING: Brand: CARDINAL HEALTH

## (undated) DEVICE — ECHELON FLEX  POWERED VASCULAR STAPLER WITH ADVANCED PLACEMENT TIP, 35MM: Brand: ECHELON FLEX

## (undated) DEVICE — SOLUTION IRRIG 1000ML ST H2O AQUALITE PLAS

## (undated) DEVICE — ECHELON 3000 45 STANDARD: Brand: ECHELON

## (undated) DEVICE — 60 ML SYRINGE REGULAR TIP: Brand: MONOJECT

## (undated) DEVICE — SINGLE USE SUCTION VALVE MAJ-209: Brand: SINGLE USE SUCTION VALVE (STERILE)

## (undated) DEVICE — ENDOSCOPY CHANNEL ADAPTER: Brand: ERBE

## (undated) DEVICE — TRAY CATH 16FR F INCL BARDX IC COMPLT CARE

## (undated) DEVICE — THE ECHELON, ECHELON ENDOPATH™ AND ECHELON FLEX™ FAMILIES OF ENDOSCOPIC LINEAR CUTTERS AND RELOADS ARE STERILE, SINGLE PATIENT USE INSTRUMENTS THAT SIMULTANEOUSLY CUT AND STAPLE TISSUE. THERE ARE SIX STAGGERED ROWS OF STAPLES, THREE ON EITHER SIDE OF THE CUT LINE. THE 45 MM INSTRUMENTS HAVE A STAPLE LINE THATIS APPROXIMATELY 45 MM LONG AND A CUT LINE THAT IS APPROXIMATELY 42 MM LONG. THE SHAFT CAN ROTATE FREELY IN BOTH DIRECTIONS AND AN ARTICULATION MECHANISM ON ARTICULATING INSTRUMENTS ENABLES BENDING THE DISTAL PORTIONOF THE SHAFT TO FACILITATE LATERAL ACCESS OF THE OPERATIVE SITE.THE INSTRUMENTS ARE SHIPPED WITHOUT A RELOAD AND MUST BE LOADED PRIOR TO USE. A STAPLE RETAINING CAP ON THE RELOAD PROTECTS THE STAPLE LEG POINTS DURING SHIPPING AND TRANSPORTATION. THE INSTRUMENTS’ LOCK-OUT FEATURE IS DESIGNED TO PREVENT A USED RELOAD FROM BEING REFIRED.: Brand: ECHELON ENDOPATH

## (undated) DEVICE — 3M™ IOBAN™ 2 ANTIMICROBIAL INCISE DRAPE 6650EZ: Brand: IOBAN™ 2

## (undated) DEVICE — CATHETER THOR 28FR L23IN CLR PVC HEP STR TAPR

## (undated) DEVICE — ANCHOR TISSUE RETRIEVAL SYSTEM, BAG SIZE 1200 ML, PORT SIZE 15 MM: Brand: ANCHOR TISSUE RETRIEVAL SYSTEM

## (undated) DEVICE — SUT MCRYL 4-0 18IN PS-2 ABSRB UD 19MM 3/8 CIR

## (undated) DEVICE — SYRINGE MED 10ML SLIP TIP CLR BRL TAPR PLUNG

## (undated) DEVICE — ADHESIVE SKIN TOP FOR WND CLSR DERMBND ADV

## (undated) DEVICE — ROCKER SWITCH PENCIL BLADE ELECTRODE, HOLSTER: Brand: EDGE

## (undated) DEVICE — ABSORBABLE HEMOSTAT (OXIDIZED REGENERATED CELLULOSE): Brand: SURGICEL NU-KNIT

## (undated) DEVICE — NON-ADHERENT PAD PREPACK: Brand: TELFA

## (undated) DEVICE — KIT CUSTOM ENDOPROCEDURE STERIS

## (undated) DEVICE — KIT,ANTI FOG,W/SPONGE & FLUID,SOFT PACK: Brand: MEDLINE

## (undated) DEVICE — SINGLE USE BIOPSY VALVE MAJ-210: Brand: SINGLE USE BIOPSY VALVE (STERILE)

## (undated) DEVICE — SUT PERMA- 0 30IN FSL NABSRB BLK 30MM 3/8

## (undated) DEVICE — SLEEVE COMPR MD KNEE LEN SGL USE KENDALL SCD

## (undated) DEVICE — STANDARD HYPODERMIC NEEDLE,POLYPROPYLENE HUB: Brand: MONOJECT

## (undated) DEVICE — GAUZE SPONGES,USP TYPE VII GAUZE, 12 PLY: Brand: CURITY

## (undated) DEVICE — SYRINGE MED 10ML LL TIP W/O SFTY DISP

## (undated) DEVICE — ENDOPATH ECHELON VASCULAR  RELOADS, WHITE, 35MM: Brand: ECHELON ENDOPATH

## (undated) DEVICE — WOUND RETRACTOR AND PROTECTOR: Brand: ALEXIS WOUND PROTECTOR-RETRACTOR

## (undated) DEVICE — GLOVE SUR 7.5 SENSICARE PIP WHT PWD F

## (undated) DEVICE — 3M™ RED DOT™ MONITORING ELECTRODE WITH FOAM TAPE AND STICKY GEL, 50/BAG, 20/CASE, 72/PLT 2570: Brand: RED DOT™

## (undated) NOTE — Clinical Note
I saw your patient, Mr. Brown.  Please see attached note for my assessment and plans moving forward.  Thank you for involving me in his care.  Please feel free to call me with any questions at [phone]  Rusty Chaidez Thoracic Surgery

## (undated) NOTE — Clinical Note
Mr. Brown is doing well.  Please see attached note for an update on his operation and pathology. Please feel free to call me with any questions at [phone].  Thank you,  Rusty Chaidez Thoracic Surgery